# Patient Record
Sex: FEMALE | Race: WHITE | NOT HISPANIC OR LATINO | Employment: OTHER | URBAN - METROPOLITAN AREA
[De-identification: names, ages, dates, MRNs, and addresses within clinical notes are randomized per-mention and may not be internally consistent; named-entity substitution may affect disease eponyms.]

---

## 2018-04-17 DIAGNOSIS — M25.552 LEFT HIP PAIN: Primary | ICD-10-CM

## 2018-04-19 ENCOUNTER — HOSPITAL ENCOUNTER (OUTPATIENT)
Dept: RADIOLOGY | Facility: HOSPITAL | Age: 78
Discharge: HOME | End: 2018-04-19
Attending: ORTHOPAEDIC SURGERY
Payer: MEDICARE

## 2018-04-19 ENCOUNTER — OFFICE VISIT (OUTPATIENT)
Dept: ORTHOPEDICS | Facility: CLINIC | Age: 78
End: 2018-04-19
Payer: MEDICARE

## 2018-04-19 VITALS
HEART RATE: 56 BPM | SYSTOLIC BLOOD PRESSURE: 150 MMHG | BODY MASS INDEX: 25.76 KG/M2 | WEIGHT: 140 LBS | HEIGHT: 62 IN | DIASTOLIC BLOOD PRESSURE: 67 MMHG

## 2018-04-19 DIAGNOSIS — M70.62 TROCHANTERIC BURSITIS, LEFT HIP: Primary | ICD-10-CM

## 2018-04-19 DIAGNOSIS — M25.552 LEFT HIP PAIN: ICD-10-CM

## 2018-04-19 PROCEDURE — 99213 OFFICE O/P EST LOW 20 MIN: CPT | Mod: PBBFAC,PN | Performed by: ORTHOPAEDIC SURGERY

## 2018-04-19 PROCEDURE — 99999 PR PBB SHADOW E&M-EST. PATIENT-LVL III: CPT | Mod: PBBFAC,,, | Performed by: ORTHOPAEDIC SURGERY

## 2018-04-19 PROCEDURE — 99203 OFFICE O/P NEW LOW 30 MIN: CPT | Mod: S$PBB,,, | Performed by: ORTHOPAEDIC SURGERY

## 2018-04-19 NOTE — PROGRESS NOTES
History reviewed. No pertinent past medical history.    History reviewed. No pertinent surgical history.    No current outpatient prescriptions on file.     No current facility-administered medications for this visit.        Review of patient's allergies indicates:   Allergen Reactions    Flagyl [metronidazole hcl]     Morphine     Pcn [penicillins]     Sulfa (sulfonamide antibiotics)        History reviewed. No pertinent family history.    Social History     Social History    Marital status:      Spouse name: N/A    Number of children: N/A    Years of education: N/A     Occupational History    Not on file.     Social History Main Topics    Smoking status: Never Smoker    Smokeless tobacco: Never Used    Alcohol use Not on file    Drug use: Unknown    Sexual activity: Not on file     Other Topics Concern    Not on file     Social History Narrative    No narrative on file       Chief Complaint:   Chief Complaint   Patient presents with    Left Hip - Pain       History of present illness: This is a 78-year-old female seen for left hip and buttock pain.  Patient states that the pain started on April 12, 2018.  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  A lot of pain in the lateral hip and buttock since then.  She went to the emergency room.  She had a steroid injections steroid pills.  Symptoms are improving.  Pain is moderate.  She rates the pain as a 4 out of 10.  She is currently on Aleve.  Pain with prolonged standing and walking.  Has to flex at the hip for the pain to go away.      Review of Systems:    Constitution: Negative for chills, fever, and sweats.  Negative for unexplained weight loss.    HENT:  Negative for headaches and blurry vision.    Cardiovascular:Negative for chest pain or irregular heart beat. Negative for hypertension.    Respiratory:  Negative for cough and shortness of breath.    Gastrointestinal: Negative for abdominal pain, heartburn, melena, nausea,  and vomitting.    Genitourinary:  Negative bladder incontinence and dysuria.    Musculoskeletal:  See HPI    Neurological: Negative for numbness.    Psychiatric/Behavioral: Negative for depression.  The patient is not nervous/anxious.      Endocrine: Negative for polyuria    Hematologic/Lymphatic: Negative for bleeding problem.  Does not bruise/bleed easily.    Skin: Negative for poor would healing and rash      Physical Examination:    Vital Signs:    Vitals:    04/19/18 1351   BP: (!) 150/67   Pulse: (!) 56       Body mass index is 25.61 kg/m².    This a well-developed, well nourished patient in no acute distress.  They are alert and oriented and cooperative to examination.  Pt. walks without an antalgic gait.      Examination of the patient's left hip shows full range of motion with flexion to 160°, extension to 0, external rotation to 50°, internal rotation of 15°, abduction of 50°, adduction of 15°. Skin has no rashes or bruising. Patient has negative Stinchfield exam. Patient has negative straight leg raise.Negative internal impingement test. Negative RNONIE test. Negative Hosea's test. Patient has no pain with hip range of motion.  Mildly tender to palpation over the greater trochanteric bursa. Patient is 5 out of 5 motor strength, palpable distal pulses, and intact light touch sensation.     Examination of the patient's right hip shows full range of motion with flexion to 160°, extension to 0, external rotation to 50°, internal rotation of 15°, abduction of 50°, adduction of 15°. Skin has no rashes or bruising. Patient has negative Stinchfield exam. Patient has negative straight leg raise.Negative internal impingement test. Negative RONNIE test. Negative Hosea's test. Patient has no pain with hip range of motion. Nontender to palpation over the greater trochanteric bursa. Patient is 5 out of 5 motor strength, palpable distal pulses, and intact light touch sensation.     X-rays: X-rays left hip are available from  outside facility which show well-maintained joint space     Assessment:: Possible left bursitis for sciatica    Plan:  I reviewed the findings with her today.  Patient had appropriate treatment already.  I recommended some physical therapy to see if we can't speed up the process.  Continue to take the Aleve.    This note was created using Dragon voice recognition software that occasionally misinterpreted phrases or words.    Consult note is delivered via Epic messaging service.

## 2019-09-09 ENCOUNTER — TELEPHONE (OUTPATIENT)
Dept: FAMILY MEDICINE | Facility: CLINIC | Age: 79
End: 2019-09-09

## 2019-09-09 NOTE — TELEPHONE ENCOUNTER
Spoke with pt, she is a new pt.We will not be able to see her at this time. She will need to go to the ER. Pt states she will go to the ER.

## 2019-09-09 NOTE — TELEPHONE ENCOUNTER
----- Message from Brittnee Hawkins LPN sent at 9/9/2019 12:39 PM CDT -----      ----- Message -----  From: Coty Rodriguez  Sent: 9/9/2019  11:58 AM  To: Monster Cardona Staff    Pt has an appt. On the 25th and would like to be seen sooner. She has a pain on her right side, nauseated, just doesn't feel good. Thought she could shake it but is still going on after 2 weeks.   [phone]

## 2019-09-09 NOTE — TELEPHONE ENCOUNTER
----- Message from Coty Rodriguez sent at 9/9/2019 11:58 AM CDT -----  Pt has an appt. On the 25th and would like to be seen sooner. She has a pain on her right side, nauseated, just doesn't feel good. Thought she could shake it but is still going on after 2 weeks.   [phone]

## 2019-09-25 ENCOUNTER — OFFICE VISIT (OUTPATIENT)
Dept: FAMILY MEDICINE | Facility: CLINIC | Age: 79
End: 2019-09-25
Payer: MEDICARE

## 2019-09-25 VITALS
BODY MASS INDEX: 27.29 KG/M2 | HEART RATE: 74 BPM | SYSTOLIC BLOOD PRESSURE: 130 MMHG | DIASTOLIC BLOOD PRESSURE: 70 MMHG | HEIGHT: 60 IN | WEIGHT: 139 LBS

## 2019-09-25 DIAGNOSIS — F32.A DEPRESSION, UNSPECIFIED DEPRESSION TYPE: ICD-10-CM

## 2019-09-25 DIAGNOSIS — Z90.49 HISTORY OF COLON RESECTION: ICD-10-CM

## 2019-09-25 DIAGNOSIS — E78.5 HYPERLIPIDEMIA, UNSPECIFIED HYPERLIPIDEMIA TYPE: Primary | ICD-10-CM

## 2019-09-25 DIAGNOSIS — G45.9 TIA (TRANSIENT ISCHEMIC ATTACK): ICD-10-CM

## 2019-09-25 DIAGNOSIS — I10 HYPERTENSION, UNSPECIFIED TYPE: ICD-10-CM

## 2019-09-25 DIAGNOSIS — E03.9 HYPOTHYROIDISM, UNSPECIFIED TYPE: ICD-10-CM

## 2019-09-25 DIAGNOSIS — K21.9 GASTROESOPHAGEAL REFLUX DISEASE, ESOPHAGITIS PRESENCE NOT SPECIFIED: ICD-10-CM

## 2019-09-25 PROCEDURE — 99214 OFFICE O/P EST MOD 30 MIN: CPT | Mod: S$GLB,,, | Performed by: NURSE PRACTITIONER

## 2019-09-25 PROCEDURE — 99214 PR OFFICE/OUTPT VISIT, EST, LEVL IV, 30-39 MIN: ICD-10-PCS | Mod: S$GLB,,, | Performed by: NURSE PRACTITIONER

## 2019-09-25 RX ORDER — LEVOTHYROXINE SODIUM 50 UG/1
50 TABLET ORAL DAILY
COMMUNITY
End: 2020-05-04

## 2019-09-25 RX ORDER — LISINOPRIL 10 MG/1
TABLET ORAL DAILY
COMMUNITY
End: 2020-01-02

## 2019-09-25 RX ORDER — CLOPIDOGREL BISULFATE 75 MG/1
75 TABLET ORAL DAILY
COMMUNITY
End: 2020-10-15 | Stop reason: SDUPTHER

## 2019-09-25 RX ORDER — SERTRALINE HYDROCHLORIDE 50 MG/1
50 TABLET, FILM COATED ORAL DAILY
COMMUNITY
End: 2020-09-14 | Stop reason: SDUPTHER

## 2019-09-25 RX ORDER — PANTOPRAZOLE SODIUM 40 MG/1
40 TABLET, DELAYED RELEASE ORAL DAILY
COMMUNITY
End: 2020-02-20 | Stop reason: SDUPTHER

## 2019-09-25 RX ORDER — ASPIRIN 81 MG/1
81 TABLET ORAL DAILY
COMMUNITY
End: 2022-10-03

## 2019-09-25 RX ORDER — PRAVASTATIN SODIUM 20 MG/1
20 TABLET ORAL DAILY
COMMUNITY
End: 2019-10-08 | Stop reason: DRUGHIGH

## 2019-09-25 RX ORDER — AMLODIPINE BESYLATE 5 MG/1
5 TABLET ORAL DAILY
COMMUNITY
End: 2020-11-06 | Stop reason: SDUPTHER

## 2019-09-25 NOTE — PROGRESS NOTES
Patient ID: Hailey Mariano is a 79 y.o. female.    Chief Complaint: Annual Exam (New pt )    HPI  Presents as new patient to establish care. No recently primary care provider. History of Tia in 2017. Treated for hypertension, hyperlipidemia, hypothyroid gerd and depression. This processes she reports are well controlled with medication. History of colon resection with DR. Goff several years ago due to significant diverticulitis. No problems persist. Sleeps ok. Likes to stay up late playing games and sleeps until about 9. Stays busy during the day. No recent labs.       Past Medical History:   Diagnosis Date    Anxiety     Depression     Diverticulitis     GERD (gastroesophageal reflux disease)     Hyperlipidemia     Hypertension     Ovarian cancer 2002    TIA (transient ischemic attack)      Past Surgical History:   Procedure Laterality Date    HERNIA REPAIR      HYSTERECTOMY           Tobacco History:  reports that she has never smoked. She has never used smokeless tobacco.      Review of patient's allergies indicates:   Allergen Reactions    Flagyl [metronidazole hcl]     Morphine     Pcn [penicillins]     Sulfa (sulfonamide antibiotics)        Current Outpatient Medications:     amLODIPine (NORVASC) 5 MG tablet, Take 5 mg by mouth once daily., Disp: , Rfl:     aspirin (ECOTRIN) 81 MG EC tablet, Take 81 mg by mouth once daily., Disp: , Rfl:     clopidogrel (PLAVIX) 75 mg tablet, Take 75 mg by mouth once daily., Disp: , Rfl:     levothyroxine (SYNTHROID) 50 MCG tablet, Take 50 mcg by mouth once daily., Disp: , Rfl:     lisinopril 10 MG tablet, Take by mouth once daily., Disp: , Rfl:     multivit-minerals/folic acid (SPECTRAVITE ADULT ORAL), Take by mouth., Disp: , Rfl:     pantoprazole (PROTONIX) 40 MG tablet, Take 40 mg by mouth once daily., Disp: , Rfl:     pravastatin (PRAVACHOL) 20 MG tablet, Take 20 mg by mouth once daily., Disp: , Rfl:     sertraline (ZOLOFT) 50 MG tablet, Take  "50 mg by mouth once daily., Disp: , Rfl:     Review of Systems   Constitutional: Negative for chills, fever and unexpected weight change.   HENT: Negative for ear pain, rhinorrhea and sore throat.    Eyes: Negative for pain and visual disturbance.   Respiratory: Negative for cough and shortness of breath.    Cardiovascular: Negative for chest pain, palpitations and leg swelling.   Gastrointestinal: Negative for abdominal pain, diarrhea, nausea and vomiting.   Genitourinary: Negative for difficulty urinating, hematuria and vaginal bleeding.   Musculoskeletal: Negative for arthralgias.   Skin: Negative for rash.   Neurological: Negative for dizziness, weakness and headaches.   Psychiatric/Behavioral: Negative for agitation and sleep disturbance. The patient is not nervous/anxious.           Objective:      Vitals:    09/25/19 1235   BP: 130/70   Pulse: 74   Weight: 63 kg (139 lb)   Height: 4' 11.5" (1.511 m)     Physical Exam   Constitutional: She is oriented to person, place, and time. She appears well-developed and well-nourished.   HENT:   Head: Normocephalic.   Right Ear: External ear normal.   Left Ear: External ear normal.   Mouth/Throat: Oropharynx is clear and moist.   Eyes: Pupils are equal, round, and reactive to light. Conjunctivae are normal.   Neck: Normal range of motion. Neck supple. No JVD present.   Cardiovascular: Normal rate and regular rhythm.   No murmur heard.  Pulmonary/Chest: Effort normal and breath sounds normal.   Abdominal: Soft. Bowel sounds are normal.   Musculoskeletal: Normal range of motion. She exhibits no edema or deformity.   Lymphadenopathy:     She has no cervical adenopathy.   Neurological: She is alert and oriented to person, place, and time. Gait normal.   Skin: Skin is warm, dry and intact. No rash noted.   Psychiatric: She has a normal mood and affect. Her speech is normal and behavior is normal.         Assessment:       1. Hyperlipidemia, unspecified hyperlipidemia type  "   2. TIA (transient ischemic attack)    3. Hypertension, unspecified type    4. Gastroesophageal reflux disease, esophagitis presence not specified    5. Depression, unspecified depression type    6. Hypothyroidism, unspecified type    7. History of colon resection           Plan:       Hyperlipidemia, unspecified hyperlipidemia type  -     Comprehensive metabolic panel; Future; Expected date: 09/25/2019  -     Lipid panel; Future; Expected date: 09/25/2019    TIA (transient ischemic attack)  -     CBC auto differential; Future; Expected date: 09/25/2019    Hypertension, unspecified type  -     Microalbumin/creatinine urine ratio  -     Urinalysis, Reflex to Urine Culture Urine, Clean Catch    Gastroesophageal reflux disease, esophagitis presence not specified    Depression, unspecified depression type    Hypothyroidism, unspecified type  -     TSH w/reflex to FT4; Future; Expected date: 09/25/2019    History of colon resection      Follow up in about 3 months (around 12/25/2019).        9/27/2019 Linnette Ghotra NP

## 2019-10-01 LAB
ALBUMIN SERPL-MCNC: 4.1 G/DL (ref 3.6–5.1)
ALBUMIN/GLOB SERPL: 1.8 (CALC) (ref 1–2.5)
ALP SERPL-CCNC: 40 U/L (ref 33–130)
ALT SERPL-CCNC: 15 U/L (ref 6–29)
AST SERPL-CCNC: 18 U/L (ref 10–35)
BASOPHILS # BLD AUTO: 42 CELLS/UL (ref 0–200)
BASOPHILS NFR BLD AUTO: 0.6 %
BILIRUB SERPL-MCNC: 0.5 MG/DL (ref 0.2–1.2)
BUN SERPL-MCNC: 22 MG/DL (ref 7–25)
BUN/CREAT SERPL: 21 (CALC) (ref 6–22)
CALCIUM SERPL-MCNC: 9.4 MG/DL (ref 8.6–10.4)
CHLORIDE SERPL-SCNC: 108 MMOL/L (ref 98–110)
CHOLEST SERPL-MCNC: 207 MG/DL
CHOLEST/HDLC SERPL: 4.7 (CALC)
CO2 SERPL-SCNC: 27 MMOL/L (ref 20–32)
CREAT SERPL-MCNC: 1.04 MG/DL (ref 0.6–0.93)
EOSINOPHIL # BLD AUTO: 224 CELLS/UL (ref 15–500)
EOSINOPHIL NFR BLD AUTO: 3.2 %
ERYTHROCYTE [DISTWIDTH] IN BLOOD BY AUTOMATED COUNT: 13 % (ref 11–15)
GFRSERPLBLD MDRD-ARVRAT: 51 ML/MIN/1.73M2
GLOBULIN SER CALC-MCNC: 2.3 G/DL (CALC) (ref 1.9–3.7)
GLUCOSE SERPL-MCNC: 104 MG/DL (ref 65–99)
HCT VFR BLD AUTO: 34.4 % (ref 35–45)
HDLC SERPL-MCNC: 44 MG/DL
HGB BLD-MCNC: 10.9 G/DL (ref 11.7–15.5)
LDLC SERPL CALC-MCNC: 131 MG/DL (CALC)
LYMPHOCYTES # BLD AUTO: 2534 CELLS/UL (ref 850–3900)
LYMPHOCYTES NFR BLD AUTO: 36.2 %
MCH RBC QN AUTO: 28.5 PG (ref 27–33)
MCHC RBC AUTO-ENTMCNC: 31.7 G/DL (ref 32–36)
MCV RBC AUTO: 89.8 FL (ref 80–100)
MONOCYTES # BLD AUTO: 434 CELLS/UL (ref 200–950)
MONOCYTES NFR BLD AUTO: 6.2 %
NEUTROPHILS # BLD AUTO: 3766 CELLS/UL (ref 1500–7800)
NEUTROPHILS NFR BLD AUTO: 53.8 %
NONHDLC SERPL-MCNC: 163 MG/DL (CALC)
PLATELET # BLD AUTO: 218 THOUSAND/UL (ref 140–400)
PMV BLD REES-ECKER: 10.6 FL (ref 7.5–12.5)
POTASSIUM SERPL-SCNC: 4.2 MMOL/L (ref 3.5–5.3)
PROT SERPL-MCNC: 6.4 G/DL (ref 6.1–8.1)
RBC # BLD AUTO: 3.83 MILLION/UL (ref 3.8–5.1)
SODIUM SERPL-SCNC: 143 MMOL/L (ref 135–146)
TRIGL SERPL-MCNC: 183 MG/DL
TSH SERPL-ACNC: 0.7 MIU/L (ref 0.4–4.5)
WBC # BLD AUTO: 7 THOUSAND/UL (ref 3.8–10.8)

## 2019-10-08 ENCOUNTER — TELEPHONE (OUTPATIENT)
Dept: FAMILY MEDICINE | Facility: CLINIC | Age: 79
End: 2019-10-08

## 2019-10-08 DIAGNOSIS — D64.9 ANEMIA, UNSPECIFIED TYPE: Primary | ICD-10-CM

## 2019-10-08 DIAGNOSIS — N28.9 FUNCTION KIDNEY DECREASED: ICD-10-CM

## 2019-10-08 DIAGNOSIS — L30.9 DERMATITIS: ICD-10-CM

## 2019-10-08 DIAGNOSIS — E78.5 HYPERLIPIDEMIA, UNSPECIFIED HYPERLIPIDEMIA TYPE: Primary | ICD-10-CM

## 2019-10-08 NOTE — TELEPHONE ENCOUNTER
----- Message from Linnette Ghotra NP sent at 10/8/2019  2:54 PM CDT -----  You are anemic. We will do additional labs to check your iron level. Kidney function is slightly decreased. Avoid over the counter NSAIDS such as motrin, aleve, etc. Your cholesterol is elevated. Increase pravastatin to 40mg once a day. Your thyroid is within normal limits. Repeat cbc, serum ferritin, stool for blood cmp in 2 weeks.

## 2019-10-08 NOTE — TELEPHONE ENCOUNTER
Spoke to patient with lab results verbatim per Linnette. Verbalized understanding on all. New dose of Pravastatin pended for signature. Allergies and pharmacy reviewed. Remind me created for lab.     I did a 90 days supply, no refills on Pravastatin, derek. Also, Patient said she doesn't know strength of cream but thinks it is the lowest dose.

## 2019-10-08 NOTE — TELEPHONE ENCOUNTER
----- Message from Dayana Floyd sent at 10/8/2019 11:02 AM CDT -----  Needs to talk to denis about blood results.  Call [phone]. Also, needs refills on clotrimazole and betamethaxone cream.

## 2019-10-09 RX ORDER — CLOTRIMAZOLE AND BETAMETHASONE DIPROPIONATE 10; .64 MG/G; MG/G
CREAM TOPICAL 2 TIMES DAILY
Qty: 60 G | Refills: 0 | Status: SHIPPED | OUTPATIENT
Start: 2019-10-09 | End: 2020-05-04

## 2019-10-09 RX ORDER — PRAVASTATIN SODIUM 40 MG/1
40 TABLET ORAL DAILY
Qty: 90 TABLET | Refills: 0 | Status: SHIPPED | OUTPATIENT
Start: 2019-10-09 | End: 2020-02-20 | Stop reason: SDUPTHER

## 2019-10-17 ENCOUNTER — TELEPHONE (OUTPATIENT)
Dept: FAMILY MEDICINE | Facility: CLINIC | Age: 79
End: 2019-10-17

## 2019-10-17 NOTE — TELEPHONE ENCOUNTER
From Remind Me:         Linnette Ghotra NP sent at 10/8/2019  2:54 PM CDT -----   You are anemic. We will do additional labs to check your iron level. Kidney function is slightly decreased. Avoid over the counter NSAIDS such as motrin, aleve, etc. Your cholesterol is elevated. Increase pravastatin to 40mg once a day. Your thyroid is within normal limits. Repeat cbc, serum ferritin, stool for blood cmp in 2 weeks.     Orders sent. Just needs reminder call.

## 2019-10-23 LAB
ALBUMIN SERPL-MCNC: 4.2 G/DL (ref 3.6–5.1)
ALBUMIN/GLOB SERPL: 1.8 (CALC) (ref 1–2.5)
ALP SERPL-CCNC: 45 U/L (ref 33–130)
ALT SERPL-CCNC: 12 U/L (ref 6–29)
AST SERPL-CCNC: 17 U/L (ref 10–35)
BASOPHILS # BLD AUTO: 53 CELLS/UL (ref 0–200)
BASOPHILS NFR BLD AUTO: 0.7 %
BILIRUB SERPL-MCNC: 0.5 MG/DL (ref 0.2–1.2)
BUN SERPL-MCNC: 26 MG/DL (ref 7–25)
BUN/CREAT SERPL: 21 (CALC) (ref 6–22)
CALCIUM SERPL-MCNC: 9.4 MG/DL (ref 8.6–10.4)
CHLORIDE SERPL-SCNC: 106 MMOL/L (ref 98–110)
CO2 SERPL-SCNC: 29 MMOL/L (ref 20–32)
CREAT SERPL-MCNC: 1.22 MG/DL (ref 0.6–0.93)
EOSINOPHIL # BLD AUTO: 240 CELLS/UL (ref 15–500)
EOSINOPHIL NFR BLD AUTO: 3.2 %
ERYTHROCYTE [DISTWIDTH] IN BLOOD BY AUTOMATED COUNT: 12.4 % (ref 11–15)
FERRITIN SERPL-MCNC: 70 NG/ML (ref 16–288)
GFRSERPLBLD MDRD-ARVRAT: 42 ML/MIN/1.73M2
GLOBULIN SER CALC-MCNC: 2.3 G/DL (CALC) (ref 1.9–3.7)
GLUCOSE SERPL-MCNC: 112 MG/DL (ref 65–99)
HCT VFR BLD AUTO: 33.8 % (ref 35–45)
HEMOCCULT STL QL IA: NORMAL
HGB BLD-MCNC: 11.6 G/DL (ref 11.7–15.5)
LYMPHOCYTES # BLD AUTO: 2483 CELLS/UL (ref 850–3900)
LYMPHOCYTES NFR BLD AUTO: 33.1 %
MCH RBC QN AUTO: 30.1 PG (ref 27–33)
MCHC RBC AUTO-ENTMCNC: 34.3 G/DL (ref 32–36)
MCV RBC AUTO: 87.8 FL (ref 80–100)
MONOCYTES # BLD AUTO: 473 CELLS/UL (ref 200–950)
MONOCYTES NFR BLD AUTO: 6.3 %
NEUTROPHILS # BLD AUTO: 4253 CELLS/UL (ref 1500–7800)
NEUTROPHILS NFR BLD AUTO: 56.7 %
PLATELET # BLD AUTO: 230 THOUSAND/UL (ref 140–400)
PMV BLD REES-ECKER: 10.5 FL (ref 7.5–12.5)
POTASSIUM SERPL-SCNC: 4.2 MMOL/L (ref 3.5–5.3)
PROT SERPL-MCNC: 6.5 G/DL (ref 6.1–8.1)
RBC # BLD AUTO: 3.85 MILLION/UL (ref 3.8–5.1)
SODIUM SERPL-SCNC: 143 MMOL/L (ref 135–146)
WBC # BLD AUTO: 7.5 THOUSAND/UL (ref 3.8–10.8)

## 2019-10-28 ENCOUNTER — TELEPHONE (OUTPATIENT)
Dept: FAMILY MEDICINE | Facility: CLINIC | Age: 79
End: 2019-10-28

## 2019-10-28 DIAGNOSIS — E78.5 HYPERLIPIDEMIA, UNSPECIFIED HYPERLIPIDEMIA TYPE: Primary | ICD-10-CM

## 2019-10-28 NOTE — TELEPHONE ENCOUNTER
----- Message from Monster Cardona MD sent at 10/26/2019  4:57 PM CDT -----  Call patient.  Your stool test for occult blood was negative.

## 2019-10-29 NOTE — TELEPHONE ENCOUNTER
It wasn't and she isn't scheduled to repeat it where I can see as it doesn't look like you ordered it with the other labs or wanted it to be done at another date. Patient was just asking about cholesterol medication when I called with other lab results. I'm assuming you want her to continue on double dose but she doesn't have f/u lipid scheduled that I can see.

## 2019-10-29 NOTE — TELEPHONE ENCOUNTER
Notes recorded by Linnette Ghotra NP on 10/8/2019 at 2:54 PM CDT  You are anemic. We will do additional labs to check your iron level. Kidney function is slightly decreased. Avoid over the counter NSAIDS such as motrin, aleve, etc. Your cholesterol is elevated. Increase pravastatin to 40mg once a day. Your thyroid is within normal limits. Repeat cbc, serum ferritin, stool for blood cmp in 2 weeks

## 2019-10-29 NOTE — TELEPHONE ENCOUNTER
Spoke to patient with results. Would like rest of lab results. Said you were following up on anemia.

## 2019-10-29 NOTE — TELEPHONE ENCOUNTER
I'm confused on the below lab note... That was from 10/8. Patient had f/u labs already and I spoke to her today with new lab results and she wants to know if you still want her to double up on cholesterol medication.

## 2019-10-29 NOTE — TELEPHONE ENCOUNTER
----- Message from Charleen Mcclure sent at 10/29/2019 11:30 AM CDT -----  vm- patient said she has been playing phone tag with someone for her refills please give her a call back [phone]

## 2019-10-29 NOTE — TELEPHONE ENCOUNTER
Spoke to patient, states that the refills call was 2 weeks ago and has been handled. Just need to know now if she is to still double up on cholesterol medication.

## 2019-11-11 DIAGNOSIS — E04.1 THYROID NODULE: Primary | ICD-10-CM

## 2019-11-13 ENCOUNTER — HOSPITAL ENCOUNTER (OUTPATIENT)
Dept: RADIOLOGY | Facility: HOSPITAL | Age: 79
Discharge: HOME | End: 2019-11-13
Attending: OTOLARYNGOLOGY
Payer: MEDICARE

## 2019-11-13 DIAGNOSIS — E04.1 THYROID NODULE: ICD-10-CM

## 2019-11-13 PROCEDURE — 76536 US EXAM OF HEAD AND NECK: CPT | Mod: TC,PO

## 2019-11-14 ENCOUNTER — LAB VISIT (OUTPATIENT)
Dept: LAB | Facility: HOSPITAL | Age: 79
End: 2019-11-14
Attending: INTERNAL MEDICINE
Payer: MEDICARE

## 2019-11-14 DIAGNOSIS — E03.9 MYXEDEMA HEART DISEASE: Primary | ICD-10-CM

## 2019-11-14 DIAGNOSIS — I51.9 MYXEDEMA HEART DISEASE: Primary | ICD-10-CM

## 2019-11-14 DIAGNOSIS — R55 SYNCOPE AND COLLAPSE: ICD-10-CM

## 2019-11-14 DIAGNOSIS — E78.5 HYPERLIPEMIA: ICD-10-CM

## 2019-11-14 DIAGNOSIS — I67.89 ACUTE, BUT ILL-DEFINED, CEREBROVASCULAR DISEASE: ICD-10-CM

## 2019-11-14 LAB
ALT SERPL W/O P-5'-P-CCNC: 16 U/L (ref 10–44)
AST SERPL-CCNC: 19 U/L (ref 10–40)
CHOLEST SERPL-MCNC: 188 MG/DL (ref 120–199)
CHOLEST/HDLC SERPL: 4.2 {RATIO} (ref 2–5)
HDLC SERPL-MCNC: 45 MG/DL (ref 40–75)
HDLC SERPL: 23.9 % (ref 20–50)
LDLC SERPL CALC-MCNC: 107.4 MG/DL (ref 63–159)
NONHDLC SERPL-MCNC: 143 MG/DL
TRIGL SERPL-MCNC: 178 MG/DL (ref 30–150)

## 2019-11-14 PROCEDURE — 84450 TRANSFERASE (AST) (SGOT): CPT

## 2019-11-14 PROCEDURE — 36415 COLL VENOUS BLD VENIPUNCTURE: CPT

## 2019-11-14 PROCEDURE — 84460 ALANINE AMINO (ALT) (SGPT): CPT

## 2019-11-14 PROCEDURE — 80061 LIPID PANEL: CPT

## 2019-12-03 ENCOUNTER — OFFICE VISIT (OUTPATIENT)
Dept: FAMILY MEDICINE | Facility: CLINIC | Age: 79
End: 2019-12-03
Payer: MEDICARE

## 2019-12-03 VITALS
HEIGHT: 61 IN | BODY MASS INDEX: 27 KG/M2 | OXYGEN SATURATION: 98 % | HEART RATE: 64 BPM | DIASTOLIC BLOOD PRESSURE: 68 MMHG | TEMPERATURE: 98 F | WEIGHT: 143 LBS | SYSTOLIC BLOOD PRESSURE: 132 MMHG

## 2019-12-03 DIAGNOSIS — J06.9 UPPER RESPIRATORY TRACT INFECTION, UNSPECIFIED TYPE: Primary | ICD-10-CM

## 2019-12-03 DIAGNOSIS — M89.9 DISORDER OF BONE: ICD-10-CM

## 2019-12-03 PROCEDURE — 1159F PR MEDICATION LIST DOCUMENTED IN MEDICAL RECORD: ICD-10-PCS | Mod: S$GLB,,, | Performed by: NURSE PRACTITIONER

## 2019-12-03 PROCEDURE — 99213 PR OFFICE/OUTPT VISIT, EST, LEVL III, 20-29 MIN: ICD-10-PCS | Mod: S$GLB,,, | Performed by: NURSE PRACTITIONER

## 2019-12-03 PROCEDURE — 99213 OFFICE O/P EST LOW 20 MIN: CPT | Mod: S$GLB,,, | Performed by: NURSE PRACTITIONER

## 2019-12-03 PROCEDURE — 1159F MED LIST DOCD IN RCRD: CPT | Mod: S$GLB,,, | Performed by: NURSE PRACTITIONER

## 2019-12-03 RX ORDER — METHYLPREDNISOLONE 4 MG/1
TABLET ORAL
Qty: 1 PACKAGE | Refills: 0 | Status: SHIPPED | OUTPATIENT
Start: 2019-12-03 | End: 2019-12-24

## 2019-12-03 RX ORDER — FLUTICASONE PROPIONATE 50 MCG
2 SPRAY, SUSPENSION (ML) NASAL DAILY
Qty: 15.8 ML | Refills: 0 | Status: SHIPPED | OUTPATIENT
Start: 2019-12-03 | End: 2020-05-04

## 2019-12-03 NOTE — PROGRESS NOTES
Patient ID: Hailey Mariano is a 79 y.o. female.    Chief Complaint: Cough (since Friday, did not bring bottles states she does not need refills -ac ); Sore Throat; and Headache    Pt patient here for sick visit.  Started on Friday with sore throat and nasal congestion as well as body aches.  Had a fever up to 100.9 on Friday and continued to feel poorly over the weekend.  Has a lot of sinus congestion, blowing yellow mucus from her nose as well as cough.  Denies any wheezing shortness of breath.  No fever since Friday.  Admits she feels slightly better today though still feels run down          Past Medical History:   Diagnosis Date    Anxiety     Depression     Diverticulitis     GERD (gastroesophageal reflux disease)     Hyperlipidemia     Hypertension     Ovarian cancer 2002    TIA (transient ischemic attack)      Past Surgical History:   Procedure Laterality Date    HERNIA REPAIR      HYSTERECTOMY           Tobacco History:  reports that she has never smoked. She has never used smokeless tobacco.      Review of patient's allergies indicates:   Allergen Reactions    Flagyl [metronidazole hcl]     Morphine     Pcn [penicillins]     Sulfa (sulfonamide antibiotics)        Current Outpatient Medications:     amLODIPine (NORVASC) 5 MG tablet, Take 5 mg by mouth once daily., Disp: , Rfl:     aspirin (ECOTRIN) 81 MG EC tablet, Take 81 mg by mouth once daily., Disp: , Rfl:     clopidogrel (PLAVIX) 75 mg tablet, Take 75 mg by mouth once daily., Disp: , Rfl:     clotrimazole-betamethasone 1-0.05% (LOTRISONE) cream, Apply topically 2 (two) times daily., Disp: 60 g, Rfl: 0    fluticasone propionate (FLONASE) 50 mcg/actuation nasal spray, 2 sprays (100 mcg total) by Each Nostril route once daily., Disp: 15.8 mL, Rfl: 0    levothyroxine (SYNTHROID) 50 MCG tablet, Take 50 mcg by mouth once daily., Disp: , Rfl:     lisinopril 10 MG tablet, Take by mouth once daily., Disp: , Rfl:     methylPREDNISolone  "(MEDROL DOSEPACK) 4 mg tablet, use as directed, Disp: 1 Package, Rfl: 0    multivit-minerals/folic acid (SPECTRAVITE ADULT ORAL), Take by mouth., Disp: , Rfl:     pantoprazole (PROTONIX) 40 MG tablet, Take 40 mg by mouth once daily., Disp: , Rfl:     pravastatin (PRAVACHOL) 40 MG tablet, Take 1 tablet (40 mg total) by mouth once daily. For cholesterol, Disp: 90 tablet, Rfl: 0    sertraline (ZOLOFT) 50 MG tablet, Take 50 mg by mouth once daily., Disp: , Rfl:     Review of Systems   Constitutional: Negative for chills and fever (None since Friday).   HENT: Positive for congestion, postnasal drip, rhinorrhea and sinus pain. Negative for ear pain and sore throat.    Respiratory: Positive for cough. Negative for shortness of breath and wheezing.    Cardiovascular: Negative for chest pain.   Gastrointestinal: Negative for nausea and vomiting.   Skin: Negative for rash.   Neurological: Negative for dizziness and headaches.          Objective:      Vitals:    12/03/19 1151   BP: 132/68   Pulse: 64   Temp: 98.3 °F (36.8 °C)   SpO2: 98%   Weight: 64.9 kg (143 lb)   Height: 5' 1" (1.549 m)     Physical Exam   Constitutional: She is oriented to person, place, and time. She appears well-developed and well-nourished.   HENT:   Head: Normocephalic and atraumatic.   Right Ear: Tympanic membrane and ear canal normal.   Left Ear: Tympanic membrane and ear canal normal.   Nose: Mucosal edema and rhinorrhea present. Right sinus exhibits no maxillary sinus tenderness and no frontal sinus tenderness. Left sinus exhibits maxillary sinus tenderness. Left sinus exhibits no frontal sinus tenderness.   Mouth/Throat: Mucous membranes are normal. No oropharyngeal exudate or posterior oropharyngeal erythema. No tonsillar exudate.   Eyes: Conjunctivae are normal.   Neck: Neck supple.   Cardiovascular: Normal rate and regular rhythm.   Pulmonary/Chest: Breath sounds normal. She has no wheezes. She has no rales.   Neurological: She is alert and " oriented to person, place, and time.   Skin: No rash noted.   Nursing note and vitals reviewed.        Assessment:       1. Upper respiratory tract infection, unspecified type    2. Disorder of bone           Plan:       Upper respiratory tract infection, unspecified type  -     methylPREDNISolone (MEDROL DOSEPACK) 4 mg tablet; use as directed  Dispense: 1 Package; Refill: 0  -     fluticasone propionate (FLONASE) 50 mcg/actuation nasal spray; 2 sprays (100 mcg total) by Each Nostril route once daily.  Dispense: 15.8 mL; Refill: 0  -patient advised symptoms likely viral given high community activity were seeing- given 5 days of symptoms she's outside the window of treatment for flu and given no fever since Friday advised I do not feel antibiotics are indicated at this time.  Will prescribe Medrol drip dose pack to help sinus and airway congestion/inflammation.  Patient cautioned to call if she worsens including fever over 100.4, purulent sputum, wheezing or shortness of breath    Disorder of bone  -     DXA Bone Density Spine And Hip; Future; Expected date: 12/03/2019      Follow up if symptoms worsen or fail to improve.        12/3/2019 Sydney Lopez NP

## 2019-12-03 NOTE — PATIENT INSTRUCTIONS

## 2019-12-27 ENCOUNTER — TELEPHONE (OUTPATIENT)
Dept: FAMILY MEDICINE | Facility: CLINIC | Age: 79
End: 2019-12-27

## 2019-12-27 NOTE — TELEPHONE ENCOUNTER
Dr Ding had a Lipid done for this patient in Nov. With AST and ALT. Do you still need Cmp done before ov on 1/2?  She will do the urine tests that are outstanding.

## 2019-12-31 NOTE — TELEPHONE ENCOUNTER
Spoke to patient. She is in [place] until 1/2, so she will have urine tests and any other labs done at OV with Linnette /john

## 2020-01-02 ENCOUNTER — OFFICE VISIT (OUTPATIENT)
Dept: FAMILY MEDICINE | Facility: CLINIC | Age: 80
End: 2020-01-02
Payer: MEDICARE

## 2020-01-02 VITALS
DIASTOLIC BLOOD PRESSURE: 50 MMHG | BODY MASS INDEX: 26.81 KG/M2 | WEIGHT: 142 LBS | SYSTOLIC BLOOD PRESSURE: 106 MMHG | HEIGHT: 61 IN | HEART RATE: 68 BPM

## 2020-01-02 DIAGNOSIS — R05.9 COUGH: ICD-10-CM

## 2020-01-02 DIAGNOSIS — E78.5 HYPERLIPIDEMIA, UNSPECIFIED HYPERLIPIDEMIA TYPE: ICD-10-CM

## 2020-01-02 DIAGNOSIS — N28.9 FUNCTION KIDNEY DECREASED: Primary | ICD-10-CM

## 2020-01-02 DIAGNOSIS — F41.9 ANXIETY: ICD-10-CM

## 2020-01-02 DIAGNOSIS — K21.9 GASTROESOPHAGEAL REFLUX DISEASE, ESOPHAGITIS PRESENCE NOT SPECIFIED: ICD-10-CM

## 2020-01-02 DIAGNOSIS — G45.9 TIA (TRANSIENT ISCHEMIC ATTACK): ICD-10-CM

## 2020-01-02 PROCEDURE — 99214 OFFICE O/P EST MOD 30 MIN: CPT | Mod: S$GLB,,, | Performed by: NURSE PRACTITIONER

## 2020-01-02 PROCEDURE — 1159F PR MEDICATION LIST DOCUMENTED IN MEDICAL RECORD: ICD-10-PCS | Mod: S$GLB,,, | Performed by: NURSE PRACTITIONER

## 2020-01-02 PROCEDURE — 1159F MED LIST DOCD IN RCRD: CPT | Mod: S$GLB,,, | Performed by: NURSE PRACTITIONER

## 2020-01-02 PROCEDURE — 99214 PR OFFICE/OUTPT VISIT, EST, LEVL IV, 30-39 MIN: ICD-10-PCS | Mod: S$GLB,,, | Performed by: NURSE PRACTITIONER

## 2020-01-02 NOTE — PROGRESS NOTES
SUBJECTIVE:    Patient ID: Hailey Mariano is a 79 y.o. female.    Chief Complaint: Follow-up (no bottles, has ? about Lisinopril// SW)    79-year-old female presents for check up.  Reports overall doing okay.  Recently had thyroid biopsy performed in ENT office.  Biopsy results showed benign thyroid lesions.  However patient does report still having nonproductive cough.  Concerned that may be related to lisinopril.  Not short duration of cough or when cough actually started.  Pretty persistent throughout the day.  Feels great otherwise.  Last labs did show decreased kidney function.  Patient is prepared to repeat kidney function today.  Patient does report at that time was taken NSAIDs on a daily basis.  However since kidney function was decreased on last labs patient has stopped.  Sleeps okay.  Has improved significantly since last visit.      Lab Visit on 11/14/2019   Component Date Value Ref Range Status    Cholesterol 11/14/2019 188  120 - 199 mg/dL Final    Triglycerides 11/14/2019 178* 30 - 150 mg/dL Final    HDL 11/14/2019 45  40 - 75 mg/dL Final    LDL Cholesterol 11/14/2019 107.4  63.0 - 159.0 mg/dL Final    Hdl/Cholesterol Ratio 11/14/2019 23.9  20.0 - 50.0 % Final    Total Cholesterol/HDL Ratio 11/14/2019 4.2  2.0 - 5.0 Final    Non-HDL Cholesterol 11/14/2019 143  mg/dL Final    AST 11/14/2019 19  10 - 40 U/L Final    ALT 11/14/2019 16  10 - 44 U/L Final   Orders Only on 10/22/2019   Component Date Value Ref Range Status    Fecal Globin by [employer]. (Medic* 10/22/2019    Final   Orders Only on 10/08/2019   Component Date Value Ref Range Status    WBC 10/21/2019 7.5  3.8 - 10.8 Thousand/uL Final    RBC 10/21/2019 3.85  3.80 - 5.10 Million/uL Final    Hemoglobin 10/21/2019 11.6* 11.7 - 15.5 g/dL Final    Hematocrit 10/21/2019 33.8* 35.0 - 45.0 % Final    Mean Corpuscular Volume 10/21/2019 87.8  80.0 - 100.0 fL Final    Mean Corpuscular Hemoglobin 10/21/2019 30.1  27.0 - 33.0 pg Final     Mean Corpuscular Hemoglobin Conc 10/21/2019 34.3  32.0 - 36.0 g/dL Final    RDW 10/21/2019 12.4  11.0 - 15.0 % Final    Platelets 10/21/2019 230  140 - 400 Thousand/uL Final    MPV 10/21/2019 10.5  7.5 - 12.5 fL Final    Neutrophils Absolute 10/21/2019 4,253  1,500 - 7,800 cells/uL Final    Lymph # 10/21/2019 2,483  850 - 3,900 cells/uL Final    Mono # 10/21/2019 473  200 - 950 cells/uL Final    Eos # 10/21/2019 240  15 - 500 cells/uL Final    Baso # 10/21/2019 53  0 - 200 cells/uL Final    Neutrophils Relative 10/21/2019 56.7  % Final    Lymph% 10/21/2019 33.1  % Final    Mono% 10/21/2019 6.3  % Final    Eosinophil% 10/21/2019 3.2  % Final    Basophil% 10/21/2019 0.7  % Final    Ferritin 10/21/2019 70  16 - 288 ng/mL Final    Glucose 10/21/2019 112* 65 - 99 mg/dL Final    BUN, Bld 10/21/2019 26* 7 - 25 mg/dL Final    Creatinine 10/21/2019 1.22* 0.60 - 0.93 mg/dL Final    eGFR if non African American 10/21/2019 42* > OR = 60 mL/min/1.73m2 Final    eGFR if  10/21/2019 49* > OR = 60 mL/min/1.73m2 Final    BUN/Creatinine Ratio 10/21/2019 21  6 - 22 (calc) Final    Sodium 10/21/2019 143  135 - 146 mmol/L Final    Potassium 10/21/2019 4.2  3.5 - 5.3 mmol/L Final    Chloride 10/21/2019 106  98 - 110 mmol/L Final    CO2 10/21/2019 29  20 - 32 mmol/L Final    Calcium 10/21/2019 9.4  8.6 - 10.4 mg/dL Final    Total Protein 10/21/2019 6.5  6.1 - 8.1 g/dL Final    Albumin 10/21/2019 4.2  3.6 - 5.1 g/dL Final    Globulin, Total 10/21/2019 2.3  1.9 - 3.7 g/dL (calc) Final    Albumin/Globulin Ratio 10/21/2019 1.8  1.0 - 2.5 (calc) Final    Total Bilirubin 10/21/2019 0.5  0.2 - 1.2 mg/dL Final    Alkaline Phosphatase 10/21/2019 45  33 - 130 U/L Final    AST 10/21/2019 17  10 - 35 U/L Final    ALT 10/21/2019 12  6 - 29 U/L Final   Office Visit on 09/25/2019   Component Date Value Ref Range Status    WBC 09/30/2019 7.0  3.8 - 10.8 Thousand/uL Final    RBC 09/30/2019 3.83  3.80 -  5.10 Million/uL Final    Hemoglobin 09/30/2019 10.9* 11.7 - 15.5 g/dL Final    Hematocrit 09/30/2019 34.4* 35.0 - 45.0 % Final    Mean Corpuscular Volume 09/30/2019 89.8  80.0 - 100.0 fL Final    Mean Corpuscular Hemoglobin 09/30/2019 28.5  27.0 - 33.0 pg Final    Mean Corpuscular Hemoglobin Conc 09/30/2019 31.7* 32.0 - 36.0 g/dL Final    RDW 09/30/2019 13.0  11.0 - 15.0 % Final    Platelets 09/30/2019 218  140 - 400 Thousand/uL Final    MPV 09/30/2019 10.6  7.5 - 12.5 fL Final    Neutrophils Absolute 09/30/2019 3,766  1,500 - 7,800 cells/uL Final    Lymph # 09/30/2019 2,534  850 - 3,900 cells/uL Final    Mono # 09/30/2019 434  200 - 950 cells/uL Final    Eos # 09/30/2019 224  15 - 500 cells/uL Final    Baso # 09/30/2019 42  0 - 200 cells/uL Final    Neutrophils Relative 09/30/2019 53.8  % Final    Lymph% 09/30/2019 36.2  % Final    Mono% 09/30/2019 6.2  % Final    Eosinophil% 09/30/2019 3.2  % Final    Basophil% 09/30/2019 0.6  % Final    Glucose 09/30/2019 104* 65 - 99 mg/dL Final    BUN, Bld 09/30/2019 22  7 - 25 mg/dL Final    Creatinine 09/30/2019 1.04* 0.60 - 0.93 mg/dL Final    eGFR if non African American 09/30/2019 51* > OR = 60 mL/min/1.73m2 Final    eGFR if  09/30/2019 59* > OR = 60 mL/min/1.73m2 Final    BUN/Creatinine Ratio 09/30/2019 21  6 - 22 (calc) Final    Sodium 09/30/2019 143  135 - 146 mmol/L Final    Potassium 09/30/2019 4.2  3.5 - 5.3 mmol/L Final    Chloride 09/30/2019 108  98 - 110 mmol/L Final    CO2 09/30/2019 27  20 - 32 mmol/L Final    Calcium 09/30/2019 9.4  8.6 - 10.4 mg/dL Final    Total Protein 09/30/2019 6.4  6.1 - 8.1 g/dL Final    Albumin 09/30/2019 4.1  3.6 - 5.1 g/dL Final    Globulin, Total 09/30/2019 2.3  1.9 - 3.7 g/dL (calc) Final    Albumin/Globulin Ratio 09/30/2019 1.8  1.0 - 2.5 (calc) Final    Total Bilirubin 09/30/2019 0.5  0.2 - 1.2 mg/dL Final    Alkaline Phosphatase 09/30/2019 40  33 - 130 U/L Final    AST  09/30/2019 18  10 - 35 U/L Final    ALT 09/30/2019 15  6 - 29 U/L Final    Cholesterol 09/30/2019 207* <200 mg/dL Final    HDL 09/30/2019 44* >50 mg/dL Final    Triglycerides 09/30/2019 183* <150 mg/dL Final    LDL Cholesterol 09/30/2019 131* mg/dL (calc) Final    Hdl/Cholesterol Ratio 09/30/2019 4.7  <5.0 (calc) Final    Non HDL Chol. (LDL+VLDL) 09/30/2019 163* <130 mg/dL (calc) Final    TSH w/reflex to FT4 09/30/2019 0.70  0.40 - 4.50 mIU/L Final       Past Medical History:   Diagnosis Date    Anxiety     Depression     Diverticulitis     GERD (gastroesophageal reflux disease)     Hyperlipidemia     Hypertension     Ovarian cancer 2002    TIA (transient ischemic attack)      Past Surgical History:   Procedure Laterality Date    HERNIA REPAIR      HYSTERECTOMY       History reviewed. No pertinent family history.    Marital Status:   Alcohol History:  reports that she drank about 1.0 standard drinks of alcohol per week.  Tobacco History:  reports that she has never smoked. She has never used smokeless tobacco.  Drug History:  has no drug history on file.    Review of patient's allergies indicates:   Allergen Reactions    Flagyl [metronidazole hcl]     Morphine     Pcn [penicillins]     Sulfa (sulfonamide antibiotics)        Current Outpatient Medications:     amLODIPine (NORVASC) 5 MG tablet, Take 5 mg by mouth once daily., Disp: , Rfl:     aspirin (ECOTRIN) 81 MG EC tablet, Take 81 mg by mouth once daily., Disp: , Rfl:     clopidogrel (PLAVIX) 75 mg tablet, Take 75 mg by mouth once daily., Disp: , Rfl:     clotrimazole-betamethasone 1-0.05% (LOTRISONE) cream, Apply topically 2 (two) times daily., Disp: 60 g, Rfl: 0    fluticasone propionate (FLONASE) 50 mcg/actuation nasal spray, 2 sprays (100 mcg total) by Each Nostril route once daily., Disp: 15.8 mL, Rfl: 0    levothyroxine (SYNTHROID) 50 MCG tablet, Take 50 mcg by mouth once daily., Disp: , Rfl:     multivit-minerals/folic  "acid (SPECTRAVITE ADULT ORAL), Take by mouth., Disp: , Rfl:     pantoprazole (PROTONIX) 40 MG tablet, Take 40 mg by mouth once daily., Disp: , Rfl:     pravastatin (PRAVACHOL) 40 MG tablet, Take 1 tablet (40 mg total) by mouth once daily. For cholesterol, Disp: 90 tablet, Rfl: 0    sertraline (ZOLOFT) 50 MG tablet, Take 50 mg by mouth once daily., Disp: , Rfl:     Review of Systems   Constitutional: Negative for chills, fever and unexpected weight change.   HENT: Negative for ear pain, rhinorrhea and sore throat.    Eyes: Negative for pain and visual disturbance.   Respiratory: Positive for cough. Negative for shortness of breath.    Cardiovascular: Negative for chest pain, palpitations and leg swelling.   Gastrointestinal: Negative for abdominal pain, diarrhea, nausea and vomiting.   Genitourinary: Negative for difficulty urinating, hematuria and vaginal bleeding.   Musculoskeletal: Negative for arthralgias.   Skin: Negative for rash.   Neurological: Negative for dizziness, weakness and headaches.   Psychiatric/Behavioral: Negative for agitation and sleep disturbance. The patient is not nervous/anxious.           Objective:      Vitals:    01/02/20 1324   BP: (!) 106/50   Pulse: 68   Weight: 64.4 kg (142 lb)   Height: 5' 1" (1.549 m)     Body mass index is 26.83 kg/m².  Physical Exam   Constitutional: She is oriented to person, place, and time. She appears well-developed and well-nourished.   HENT:   Right Ear: External ear normal.   Left Ear: External ear normal.   Mouth/Throat: Oropharynx is clear and moist.   Eyes: Pupils are equal, round, and reactive to light. Conjunctivae are normal.   Neck: Normal range of motion. Neck supple. No JVD present.   Cardiovascular: Normal rate and regular rhythm.   No murmur heard.  Pulmonary/Chest: Effort normal and breath sounds normal.   Abdominal: Soft. Bowel sounds are normal.   Musculoskeletal: Normal range of motion. She exhibits no edema or deformity. "   Lymphadenopathy:     She has no cervical adenopathy.   Neurological: She is alert and oriented to person, place, and time. Gait normal.   Skin: Skin is warm, dry and intact. No rash noted.   Psychiatric: She has a normal mood and affect. Her speech is normal and behavior is normal.         Assessment:       1. Function kidney decreased    2. Anxiety    3. Hyperlipidemia, unspecified hyperlipidemia type    4. Gastroesophageal reflux disease, esophagitis presence not specified    5. TIA (transient ischemic attack)    6. Cough         Plan:       Function kidney decreased  -     Comprehensive metabolic panel; Future; Expected date: 01/02/2020  -     Urinalysis; Future; Expected date: 01/02/2020  -     Microalbumin/creatinine urine ratio; Future; Expected date: 01/02/2020    Anxiety    Hyperlipidemia, unspecified hyperlipidemia type    Gastroesophageal reflux disease, esophagitis presence not specified    TIA (transient ischemic attack)    Cough  Comments:  Stop lisinopril due to cough.  Monitor r blood pressure daily.  Readings to office in 2 weeks.  If cough does not resolve please contact office.      Follow up in about 6 months (around 7/2/2020) for Follow up.

## 2020-01-03 LAB
ALBUMIN SERPL-MCNC: 3.9 G/DL (ref 3.6–5.1)
ALBUMIN/CREAT UR: 7 MCG/MG CREAT
ALBUMIN/GLOB SERPL: 1.6 (CALC) (ref 1–2.5)
ALP SERPL-CCNC: 40 U/L (ref 33–130)
ALT SERPL-CCNC: 10 U/L (ref 6–29)
APPEARANCE UR: CLEAR
AST SERPL-CCNC: 17 U/L (ref 10–35)
BILIRUB SERPL-MCNC: 0.4 MG/DL (ref 0.2–1.2)
BILIRUB UR QL STRIP: NEGATIVE
BUN SERPL-MCNC: 17 MG/DL (ref 7–25)
BUN/CREAT SERPL: 15 (CALC) (ref 6–22)
CALCIUM SERPL-MCNC: 8.8 MG/DL (ref 8.6–10.4)
CHLORIDE SERPL-SCNC: 106 MMOL/L (ref 98–110)
CO2 SERPL-SCNC: 27 MMOL/L (ref 20–32)
COLOR UR: ABNORMAL
CREAT SERPL-MCNC: 1.14 MG/DL (ref 0.6–0.93)
CREAT UR-MCNC: 331 MG/DL (ref 20–275)
GFRSERPLBLD MDRD-ARVRAT: 46 ML/MIN/1.73M2
GLOBULIN SER CALC-MCNC: 2.5 G/DL (CALC) (ref 1.9–3.7)
GLUCOSE SERPL-MCNC: 77 MG/DL (ref 65–139)
GLUCOSE UR QL STRIP: NEGATIVE
HGB UR QL STRIP: NEGATIVE
KETONES UR QL STRIP: ABNORMAL
LEUKOCYTE ESTERASE UR QL STRIP: ABNORMAL
MICROALBUMIN UR-MCNC: 2.2 MG/DL
NITRITE UR QL STRIP: NEGATIVE
PH UR STRIP: ABNORMAL [PH] (ref 5–8)
POTASSIUM SERPL-SCNC: 3.3 MMOL/L (ref 3.5–5.3)
PROT SERPL-MCNC: 6.4 G/DL (ref 6.1–8.1)
PROT UR QL STRIP: NEGATIVE
SODIUM SERPL-SCNC: 144 MMOL/L (ref 135–146)
SP GR UR STRIP: 1.02 (ref 1–1.03)

## 2020-01-29 DIAGNOSIS — N28.9 FUNCTION KIDNEY DECREASED: Primary | ICD-10-CM

## 2020-01-30 ENCOUNTER — TELEPHONE (OUTPATIENT)
Dept: FAMILY MEDICINE | Facility: CLINIC | Age: 80
End: 2020-01-30

## 2020-01-30 NOTE — TELEPHONE ENCOUNTER
----- Message from Linnette Ghotra NP sent at 1/29/2020  9:19 PM CST -----  Kidney function did improve since previous lab draw. Repeat cmp at the end of February.

## 2020-01-31 ENCOUNTER — TELEPHONE (OUTPATIENT)
Dept: FAMILY MEDICINE | Facility: CLINIC | Age: 80
End: 2020-01-31

## 2020-02-20 DIAGNOSIS — E78.5 HYPERLIPIDEMIA, UNSPECIFIED HYPERLIPIDEMIA TYPE: ICD-10-CM

## 2020-02-20 RX ORDER — PRAVASTATIN SODIUM 40 MG/1
40 TABLET ORAL DAILY
Qty: 90 TABLET | Refills: 1 | Status: SHIPPED | OUTPATIENT
Start: 2020-02-20 | End: 2020-09-14 | Stop reason: SDUPTHER

## 2020-02-20 RX ORDER — PANTOPRAZOLE SODIUM 40 MG/1
40 TABLET, DELAYED RELEASE ORAL DAILY
Qty: 90 TABLET | Refills: 1 | Status: SHIPPED | OUTPATIENT
Start: 2020-02-20 | End: 2020-02-26 | Stop reason: SDUPTHER

## 2020-02-20 NOTE — TELEPHONE ENCOUNTER
----- Message from Bindu Capone sent at 2/20/2020 12:47 PM CST -----  Contact: Hailey MASON 12:29  PM  Refill pravastatin and pantoprazole. [facility] on [address]. No phone # was left. GH

## 2020-02-26 RX ORDER — PANTOPRAZOLE SODIUM 40 MG/1
40 TABLET, DELAYED RELEASE ORAL DAILY
Qty: 90 TABLET | Refills: 1 | Status: SHIPPED | OUTPATIENT
Start: 2020-02-26 | End: 2020-09-14 | Stop reason: SDUPTHER

## 2020-02-26 NOTE — TELEPHONE ENCOUNTER
----- Message from Charleen Mcclure sent at 2/26/2020 12:49 PM CST -----  vm- patient needs a refill of pantoprazole

## 2020-03-03 LAB
ALBUMIN SERPL-MCNC: 4.1 G/DL (ref 3.6–5.1)
ALBUMIN/GLOB SERPL: 2 (CALC) (ref 1–2.5)
ALP SERPL-CCNC: 40 U/L (ref 37–153)
ALT SERPL-CCNC: 14 U/L (ref 6–29)
AST SERPL-CCNC: 17 U/L (ref 10–35)
BILIRUB SERPL-MCNC: 0.4 MG/DL (ref 0.2–1.2)
BUN SERPL-MCNC: 17 MG/DL (ref 7–25)
BUN/CREAT SERPL: 15 (CALC) (ref 6–22)
CALCIUM SERPL-MCNC: 9.3 MG/DL (ref 8.6–10.4)
CHLORIDE SERPL-SCNC: 105 MMOL/L (ref 98–110)
CHOLEST SERPL-MCNC: 143 MG/DL
CHOLEST/HDLC SERPL: 2.6 (CALC)
CO2 SERPL-SCNC: 27 MMOL/L (ref 20–32)
CREAT SERPL-MCNC: 1.11 MG/DL (ref 0.6–0.93)
GFRSERPLBLD MDRD-ARVRAT: 47 ML/MIN/1.73M2
GLOBULIN SER CALC-MCNC: 2.1 G/DL (CALC) (ref 1.9–3.7)
GLUCOSE SERPL-MCNC: 110 MG/DL (ref 65–139)
HDLC SERPL-MCNC: 54 MG/DL
LDLC SERPL CALC-MCNC: 62 MG/DL (CALC)
NONHDLC SERPL-MCNC: 89 MG/DL (CALC)
POTASSIUM SERPL-SCNC: 4 MMOL/L (ref 3.5–5.3)
PROT SERPL-MCNC: 6.2 G/DL (ref 6.1–8.1)
SODIUM SERPL-SCNC: 142 MMOL/L (ref 135–146)
TRIGL SERPL-MCNC: 205 MG/DL

## 2020-03-26 ENCOUNTER — TELEPHONE (OUTPATIENT)
Dept: FAMILY MEDICINE | Facility: CLINIC | Age: 80
End: 2020-03-26

## 2020-03-26 NOTE — TELEPHONE ENCOUNTER
----- Message from Linnette Ghotra NP sent at 3/26/2020 12:32 PM CDT -----  Kidney function slightly improved. Continue as is.

## 2020-03-26 NOTE — TELEPHONE ENCOUNTER
LMOR to call Belkis tomorrow as I will be leaving for the day soon and am off tomorrow. Informed her that it was not urgent.

## 2020-03-30 ENCOUNTER — TELEPHONE (OUTPATIENT)
Dept: FAMILY MEDICINE | Facility: CLINIC | Age: 80
End: 2020-03-30

## 2020-05-04 ENCOUNTER — OFFICE VISIT (OUTPATIENT)
Dept: FAMILY MEDICINE | Facility: CLINIC | Age: 80
End: 2020-05-04
Payer: MEDICARE

## 2020-05-04 ENCOUNTER — TELEPHONE (OUTPATIENT)
Dept: FAMILY MEDICINE | Facility: CLINIC | Age: 80
End: 2020-05-04

## 2020-05-04 VITALS
BODY MASS INDEX: 26.62 KG/M2 | WEIGHT: 141 LBS | HEART RATE: 64 BPM | SYSTOLIC BLOOD PRESSURE: 138 MMHG | HEIGHT: 61 IN | DIASTOLIC BLOOD PRESSURE: 60 MMHG | TEMPERATURE: 98 F

## 2020-05-04 DIAGNOSIS — G45.9 TIA (TRANSIENT ISCHEMIC ATTACK): ICD-10-CM

## 2020-05-04 DIAGNOSIS — Z79.899 HIGH RISK MEDICATION USE: ICD-10-CM

## 2020-05-04 DIAGNOSIS — K21.9 GASTROESOPHAGEAL REFLUX DISEASE, ESOPHAGITIS PRESENCE NOT SPECIFIED: ICD-10-CM

## 2020-05-04 DIAGNOSIS — F41.9 ANXIETY: ICD-10-CM

## 2020-05-04 DIAGNOSIS — E78.5 HYPERLIPIDEMIA, UNSPECIFIED HYPERLIPIDEMIA TYPE: ICD-10-CM

## 2020-05-04 DIAGNOSIS — N28.9 FUNCTION KIDNEY DECREASED: ICD-10-CM

## 2020-05-04 DIAGNOSIS — G25.81 RLS (RESTLESS LEGS SYNDROME): ICD-10-CM

## 2020-05-04 DIAGNOSIS — E03.9 HYPOTHYROIDISM, UNSPECIFIED TYPE: Primary | ICD-10-CM

## 2020-05-04 DIAGNOSIS — G47.00 INSOMNIA, UNSPECIFIED TYPE: ICD-10-CM

## 2020-05-04 PROCEDURE — 99214 OFFICE O/P EST MOD 30 MIN: CPT | Mod: S$GLB,,, | Performed by: NURSE PRACTITIONER

## 2020-05-04 PROCEDURE — 99214 PR OFFICE/OUTPT VISIT, EST, LEVL IV, 30-39 MIN: ICD-10-PCS | Mod: S$GLB,,, | Performed by: NURSE PRACTITIONER

## 2020-05-04 RX ORDER — ROSUVASTATIN CALCIUM 10 MG/1
1 TABLET, COATED ORAL NIGHTLY
COMMUNITY
Start: 2020-02-02 | End: 2020-11-03 | Stop reason: SDUPTHER

## 2020-05-04 RX ORDER — TRAZODONE HYDROCHLORIDE 50 MG/1
50 TABLET ORAL NIGHTLY
Qty: 30 TABLET | Refills: 11 | Status: SHIPPED | OUTPATIENT
Start: 2020-05-04 | End: 2020-09-23

## 2020-05-04 NOTE — PROGRESS NOTES
SUBJECTIVE:    Patient ID: Hailey Mariano is a 80 y.o. female.    Chief Complaint: Medication Refill (wants new medication, brought bottles// SW)    80-year-old female presents for check up.  Reports overall doing okay.  Recently had thyroid biopsy performed in ENT office.  Biopsy results showed benign thyroid lesions. Patient quit lisinopril. Has been checking blood pressure at home. Readings at home have been <130/90. Was taken off of lisinopril earlier this year. Since then cough has resolved.  .  Concerned that may be related to lisinopril.    Last labs did show decreased kidney function.  Patient is prepared to repeat kidney function today.  Patient does report at that time was taken NSAIDs on a daily basis.  However since kidney function was decreased on last labs patient has stopped.  Not sleeping well. Reports previously took trazodone. Thinks it worked well. Would like to restart.       Office Visit on 01/02/2020   Component Date Value Ref Range Status    Glucose 01/02/2020 77  65 - 139 mg/dL Final    BUN, Bld 01/02/2020 17  7 - 25 mg/dL Final    Creatinine 01/02/2020 1.14* 0.60 - 0.93 mg/dL Final    eGFR if non African American 01/02/2020 46* > OR = 60 mL/min/1.73m2 Final    eGFR if African American 01/02/2020 53* > OR = 60 mL/min/1.73m2 Final    BUN/Creatinine Ratio 01/02/2020 15  6 - 22 (calc) Final    Sodium 01/02/2020 144  135 - 146 mmol/L Final    Potassium 01/02/2020 3.3* 3.5 - 5.3 mmol/L Final    Chloride 01/02/2020 106  98 - 110 mmol/L Final    CO2 01/02/2020 27  20 - 32 mmol/L Final    Calcium 01/02/2020 8.8  8.6 - 10.4 mg/dL Final    Total Protein 01/02/2020 6.4  6.1 - 8.1 g/dL Final    Albumin 01/02/2020 3.9  3.6 - 5.1 g/dL Final    Globulin, Total 01/02/2020 2.5  1.9 - 3.7 g/dL (calc) Final    Albumin/Globulin Ratio 01/02/2020 1.6  1.0 - 2.5 (calc) Final    Total Bilirubin 01/02/2020 0.4  0.2 - 1.2 mg/dL Final    Alkaline Phosphatase 01/02/2020 40  33 - 130 U/L Final     AST 01/02/2020 17  10 - 35 U/L Final    ALT 01/02/2020 10  6 - 29 U/L Final    Color, UA 01/02/2020 DARK YELLOW  YELLOW Final    Appearance, UA 01/02/2020 CLEAR  CLEAR Final    Specific [place], UA 01/02/2020 1.024  1.001 - 1.035 Final    pH, UA 01/02/2020 < OR = 5.0  5.0 - 8.0 Final    Glucose, UA 01/02/2020 NEGATIVE  NEGATIVE Final    Bilirubin, UA 01/02/2020 NEGATIVE  NEGATIVE Final    Ketones, UA 01/02/2020 TRACE* NEGATIVE Final    Occult Blood UA 01/02/2020 NEGATIVE  NEGATIVE Final    Protein, UA 01/02/2020 NEGATIVE  NEGATIVE Final    Nitrite, UA 01/02/2020 NEGATIVE  NEGATIVE Final    Leukocytes, UA 01/02/2020 1+* NEGATIVE Final    Creatinine, Random Ur 01/02/2020 331* 20 - 275 mg/dL Final    Microalb, Ur 01/02/2020 2.2  See Note: mg/dL Final    Microalb Creat Ratio 01/02/2020 7  <30 mcg/mg creat Final   Lab Visit on 11/14/2019   Component Date Value Ref Range Status    Cholesterol 11/14/2019 188  120 - 199 mg/dL Final    Triglycerides 11/14/2019 178* 30 - 150 mg/dL Final    HDL 11/14/2019 45  40 - 75 mg/dL Final    LDL Cholesterol 11/14/2019 107.4  63.0 - 159.0 mg/dL Final    Hdl/Cholesterol Ratio 11/14/2019 23.9  20.0 - 50.0 % Final    Total Cholesterol/HDL Ratio 11/14/2019 4.2  2.0 - 5.0 Final    Non-HDL Cholesterol 11/14/2019 143  mg/dL Final    AST 11/14/2019 19  10 - 40 U/L Final    ALT 11/14/2019 16  10 - 44 U/L Final       Past Medical History:   Diagnosis Date    Anxiety     Depression     Diverticulitis     GERD (gastroesophageal reflux disease)     Hyperlipidemia     Hypertension     Ovarian cancer 2002    TIA (transient ischemic attack)      Past Surgical History:   Procedure Laterality Date    HERNIA REPAIR      HYSTERECTOMY       History reviewed. No pertinent family history.    Marital Status:   Alcohol History:  reports that she drank about 1.0 standard drinks of alcohol per week.  Tobacco History:  reports that she has never smoked. She has never used  smokeless tobacco.  Drug History:  has no drug history on file.    Review of patient's allergies indicates:   Allergen Reactions    Flagyl [metronidazole hcl]     Morphine     Pcn [penicillins]     Sulfa (sulfonamide antibiotics)        Current Outpatient Medications:     amLODIPine (NORVASC) 5 MG tablet, Take 5 mg by mouth once daily., Disp: , Rfl:     aspirin (ECOTRIN) 81 MG EC tablet, Take 81 mg by mouth once daily., Disp: , Rfl:     clopidogrel (PLAVIX) 75 mg tablet, Take 75 mg by mouth once daily., Disp: , Rfl:     multivit-minerals/folic acid (SPECTRAVITE ADULT ORAL), Take by mouth., Disp: , Rfl:     pantoprazole (PROTONIX) 40 MG tablet, Take 1 tablet (40 mg total) by mouth once daily., Disp: 90 tablet, Rfl: 1    pravastatin (PRAVACHOL) 40 MG tablet, Take 1 tablet (40 mg total) by mouth once daily. For cholesterol, Disp: 90 tablet, Rfl: 1    rosuvastatin (CRESTOR) 10 MG tablet, Take 1 tablet by mouth every evening., Disp: , Rfl:     sertraline (ZOLOFT) 50 MG tablet, Take 50 mg by mouth once daily., Disp: , Rfl:     traZODone (DESYREL) 50 MG tablet, Take 1 tablet (50 mg total) by mouth every evening., Disp: 30 tablet, Rfl: 11    Review of Systems   Constitutional: Negative for chills, fever and unexpected weight change.   HENT: Negative for ear pain, rhinorrhea and sore throat.    Eyes: Negative for pain.   Respiratory: Negative for cough and shortness of breath.    Cardiovascular: Negative for chest pain, palpitations and leg swelling.   Gastrointestinal: Negative for abdominal pain, diarrhea, nausea and vomiting.   Genitourinary: Negative for difficulty urinating, hematuria and vaginal bleeding.   Musculoskeletal: Negative for arthralgias.   Skin: Negative for rash.   Neurological: Negative for dizziness, weakness and headaches.   Psychiatric/Behavioral: Positive for sleep disturbance. Negative for agitation. The patient is not nervous/anxious.           Objective:      Vitals:    05/04/20 1152  "  BP: 138/60   Pulse: 64   Temp: 97.8 °F (36.6 °C)   Weight: 64 kg (141 lb)   Height: 5' 1" (1.549 m)     Body mass index is 26.64 kg/m².  Physical Exam   Constitutional: She is oriented to person, place, and time. She appears well-developed and well-nourished.   HENT:   Right Ear: External ear normal.   Left Ear: External ear normal.   Mouth/Throat: Oropharynx is clear and moist.   Neck: Normal range of motion. Neck supple. No JVD present.   Cardiovascular: Normal rate and regular rhythm.   No murmur heard.  Pulmonary/Chest: Effort normal and breath sounds normal.   Abdominal: Soft. Bowel sounds are normal.   Musculoskeletal: Normal range of motion. She exhibits no edema or deformity.   Lymphadenopathy:     She has no cervical adenopathy.   Neurological: She is alert and oriented to person, place, and time. Gait normal.   Skin: Skin is warm, dry and intact. No rash noted.   Psychiatric: She has a normal mood and affect. Her speech is normal and behavior is normal.         Assessment:       1. Hypothyroidism, unspecified type    2. Anxiety    3. Function kidney decreased    4. Hyperlipidemia, unspecified hyperlipidemia type    5. TIA (transient ischemic attack)    6. Gastroesophageal reflux disease, esophagitis presence not specified    7. Insomnia, unspecified type    8. High risk medication use    9. RLS (restless legs syndrome)         Plan:       Hypothyroidism, unspecified type  -     TSH w/reflex to FT4; Future; Expected date: 05/04/2020    Anxiety  -     CBC auto differential; Future; Expected date: 05/04/2020    Function kidney decreased  -     CBC auto differential; Future; Expected date: 05/04/2020  -     Comprehensive metabolic panel; Future; Expected date: 05/04/2020  -     Lipid Panel; Future; Expected date: 05/04/2020  -     TSH w/reflex to FT4; Future; Expected date: 05/04/2020    Hyperlipidemia, unspecified hyperlipidemia type  -     Lipid Panel; Future; Expected date: 05/04/2020    TIA (transient " ischemic attack)    Gastroesophageal reflux disease, esophagitis presence not specified  -     Urinalysis, Reflex to Urine Culture Urine, Clean Catch; Future; Expected date: 05/04/2020    Insomnia, unspecified type  -     traZODone (DESYREL) 50 MG tablet; Take 1 tablet (50 mg total) by mouth every evening.  Dispense: 30 tablet; Refill: 11  -     Urinalysis, Reflex to Urine Culture Urine, Clean Catch; Future; Expected date: 05/04/2020    High risk medication use  -     CBC auto differential; Future; Expected date: 05/04/2020  -     Lipid Panel; Future; Expected date: 05/04/2020  -     TSH w/reflex to FT4; Future; Expected date: 05/04/2020  -     Microalbumin/creatinine urine ratio; Future; Expected date: 05/04/2020  -     Urinalysis, Reflex to Urine Culture Urine, Clean Catch; Future; Expected date: 05/04/2020  -     Magnesium; Future; Expected date: 05/04/2020    RLS (restless legs syndrome)  -     Magnesium; Future; Expected date: 05/04/2020      Follow up in about 3 months (around 8/4/2020) for medication management.

## 2020-05-04 NOTE — TELEPHONE ENCOUNTER
Pt wants trazodone. We have never prescribed this for her. Pt scheduled for an appt with Linnette

## 2020-05-04 NOTE — TELEPHONE ENCOUNTER
----- Message from Ethel Mendoza sent at 5/4/2020 10:37 AM CDT -----  vm @ refill on a medication that she said is not on her med list wants the nurse to call her. [phone]

## 2020-09-14 DIAGNOSIS — E78.5 HYPERLIPIDEMIA, UNSPECIFIED HYPERLIPIDEMIA TYPE: ICD-10-CM

## 2020-09-14 DIAGNOSIS — K21.9 GASTROESOPHAGEAL REFLUX DISEASE, ESOPHAGITIS PRESENCE NOT SPECIFIED: ICD-10-CM

## 2020-09-14 DIAGNOSIS — F41.9 ANXIETY: Primary | ICD-10-CM

## 2020-09-14 DIAGNOSIS — F32.A DEPRESSION, UNSPECIFIED DEPRESSION TYPE: ICD-10-CM

## 2020-09-14 RX ORDER — PRAVASTATIN SODIUM 40 MG/1
40 TABLET ORAL DAILY
Qty: 90 TABLET | Refills: 1 | Status: SHIPPED | OUTPATIENT
Start: 2020-09-14 | End: 2020-11-23

## 2020-09-14 RX ORDER — PANTOPRAZOLE SODIUM 40 MG/1
40 TABLET, DELAYED RELEASE ORAL DAILY
Qty: 90 TABLET | Refills: 1 | Status: SHIPPED | OUTPATIENT
Start: 2020-09-14 | End: 2021-03-17 | Stop reason: SDUPTHER

## 2020-09-14 RX ORDER — SERTRALINE HYDROCHLORIDE 50 MG/1
50 TABLET, FILM COATED ORAL DAILY
Qty: 90 TABLET | Refills: 1 | Status: SHIPPED | OUTPATIENT
Start: 2020-09-14 | End: 2021-03-17 | Stop reason: SDUPTHER

## 2020-09-14 NOTE — TELEPHONE ENCOUNTER
----- Message from Bindu Capone sent at 9/14/2020 10:34 AM CDT -----  VM 10:33 Refills and she wants to change her pharmacy. I tried to call the patient back to get some information and I had to LMOR .  PT'S # [phone]  GH

## 2020-09-23 ENCOUNTER — OFFICE VISIT (OUTPATIENT)
Dept: FAMILY MEDICINE | Facility: CLINIC | Age: 80
End: 2020-09-23
Payer: MEDICARE

## 2020-09-23 VITALS
DIASTOLIC BLOOD PRESSURE: 80 MMHG | HEART RATE: 68 BPM | TEMPERATURE: 98 F | HEIGHT: 61 IN | WEIGHT: 139 LBS | SYSTOLIC BLOOD PRESSURE: 124 MMHG | BODY MASS INDEX: 26.24 KG/M2

## 2020-09-23 DIAGNOSIS — F41.9 ANXIETY: ICD-10-CM

## 2020-09-23 DIAGNOSIS — N28.9 FUNCTION KIDNEY DECREASED: ICD-10-CM

## 2020-09-23 DIAGNOSIS — E78.5 HYPERLIPIDEMIA, UNSPECIFIED HYPERLIPIDEMIA TYPE: ICD-10-CM

## 2020-09-23 DIAGNOSIS — E03.9 HYPOTHYROIDISM, UNSPECIFIED TYPE: ICD-10-CM

## 2020-09-23 DIAGNOSIS — Z79.899 HIGH RISK MEDICATION USE: ICD-10-CM

## 2020-09-23 DIAGNOSIS — G47.00 INSOMNIA, UNSPECIFIED TYPE: ICD-10-CM

## 2020-09-23 DIAGNOSIS — Z23 NEED FOR INFLUENZA VACCINATION: Primary | ICD-10-CM

## 2020-09-23 PROCEDURE — G0008 ADMIN INFLUENZA VIRUS VAC: HCPCS | Mod: S$GLB,,, | Performed by: NURSE PRACTITIONER

## 2020-09-23 PROCEDURE — 99214 OFFICE O/P EST MOD 30 MIN: CPT | Mod: 25,S$GLB,, | Performed by: NURSE PRACTITIONER

## 2020-09-23 PROCEDURE — 90662 FLU VACCINE - QUADRIVALENT - HIGH DOSE (65+) PRESERVATIVE FREE IM: ICD-10-PCS | Mod: S$GLB,,, | Performed by: NURSE PRACTITIONER

## 2020-09-23 PROCEDURE — G0008 FLU VACCINE - QUADRIVALENT - HIGH DOSE (65+) PRESERVATIVE FREE IM: ICD-10-PCS | Mod: S$GLB,,, | Performed by: NURSE PRACTITIONER

## 2020-09-23 PROCEDURE — 99214 PR OFFICE/OUTPT VISIT, EST, LEVL IV, 30-39 MIN: ICD-10-PCS | Mod: 25,S$GLB,, | Performed by: NURSE PRACTITIONER

## 2020-09-23 PROCEDURE — 90662 IIV NO PRSV INCREASED AG IM: CPT | Mod: S$GLB,,, | Performed by: NURSE PRACTITIONER

## 2020-09-23 NOTE — PROGRESS NOTES
SUBJECTIVE:    Patient ID: Hailey Mariano is a 80 y.o. female.    Chief Complaint: Follow-up (- brought bottles - denied dexa - tk)    80-year-old female presents for check up.  Reports overall doing okay.  . Readings at home have been <130/90. Was taken off of lisinopril earlier this year. Since then cough has resolved.  .  Concerned that may be related to lisinopril.    Last labs did show decreased kidney function.  Patient is prepared to repeat kidney function today.  Patient does report at that time was taken NSAIDs on a daily basis.  However since kidney function was decreased on last labs patient has stopped. Sleeping better.       Office Visit on 05/04/2020   Component Date Value Ref Range Status    WBC 09/24/2020 7.2  3.8 - 10.8 Thousand/uL Final    RBC 09/24/2020 4.44  3.80 - 5.10 Million/uL Final    Hemoglobin 09/24/2020 12.7  11.7 - 15.5 g/dL Final    Hematocrit 09/24/2020 38.7  35.0 - 45.0 % Final    MCV 09/24/2020 87.2  80.0 - 100.0 fL Final    MCH 09/24/2020 28.6  27.0 - 33.0 pg Final    MCHC 09/24/2020 32.8  32.0 - 36.0 g/dL Final    RDW 09/24/2020 12.6  11.0 - 15.0 % Final    Platelets 09/24/2020 213  140 - 400 Thousand/uL Final    MPV 09/24/2020 10.8  7.5 - 12.5 fL Final    Neutrophils Absolute 09/24/2020 4,687  1,500 - 7,800 cells/uL Final    Lymph # 09/24/2020 1,692  850 - 3,900 cells/uL Final    Mono # 09/24/2020 540  200 - 950 cells/uL Final    Eos # 09/24/2020 238  15 - 500 cells/uL Final    Baso # 09/24/2020 43  0 - 200 cells/uL Final    Neutrophils Relative 09/24/2020 65.1  % Final    Lymph % 09/24/2020 23.5  % Final    Mono % 09/24/2020 7.5  % Final    Eosinophil % 09/24/2020 3.3  % Final    Basophil % 09/24/2020 0.6  % Final    Glucose 09/24/2020 103* 65 - 99 mg/dL Final    BUN 09/24/2020 18  7 - 25 mg/dL Final    Creatinine 09/24/2020 1.08* 0.60 - 0.88 mg/dL Final    eGFR if non  09/24/2020 48* > OR = 60 mL/min/1.73m2 Final    eGFR if African  American 09/24/2020 56* > OR = 60 mL/min/1.73m2 Final    BUN/Creatinine Ratio 09/24/2020 17  6 - 22 (calc) Final    Sodium 09/24/2020 142  135 - 146 mmol/L Final    Potassium 09/24/2020 3.8  3.5 - 5.3 mmol/L Final    Chloride 09/24/2020 103  98 - 110 mmol/L Final    CO2 09/24/2020 30  20 - 32 mmol/L Final    Calcium 09/24/2020 9.5  8.6 - 10.4 mg/dL Final    Total Protein 09/24/2020 7.1  6.1 - 8.1 g/dL Final    Albumin 09/24/2020 4.6  3.6 - 5.1 g/dL Final    Globulin, Total 09/24/2020 2.5  1.9 - 3.7 g/dL (calc) Final    Albumin/Globulin Ratio 09/24/2020 1.8  1.0 - 2.5 (calc) Final    Total Bilirubin 09/24/2020 0.6  0.2 - 1.2 mg/dL Final    Alkaline Phosphatase 09/24/2020 45  37 - 153 U/L Final    AST 09/24/2020 24  10 - 35 U/L Final    ALT 09/24/2020 17  6 - 29 U/L Final    Cholesterol 09/24/2020 146  <200 mg/dL Final    HDL 09/24/2020 55  > OR = 50 mg/dL Final    Triglycerides 09/24/2020 142  <150 mg/dL Final    LDL Cholesterol 09/24/2020 68  mg/dL (calc) Final    HDL/Cholesterol Ratio 09/24/2020 2.7  <5.0 (calc) Final    Non HDL Chol. (LDL+VLDL) 09/24/2020 91  <130 mg/dL (calc) Final    TSH w/reflex to FT4 09/24/2020 4.22  0.40 - 4.50 mIU/L Final    Creatinine, Urine 09/24/2020 102  20 - 275 mg/dL Final    Microalb, Ur 09/24/2020 0.7  See Note: mg/dL Final    Microalb/Creat Ratio 09/24/2020 7  <30 mcg/mg creat Final    Color, UA 09/24/2020 YELLOW  YELLOW Final    Appearance, UA 09/24/2020 CLEAR  CLEAR Final    Specific [place], UA 09/24/2020 1.018  1.001 - 1.035 Final    pH, UA 09/24/2020 5.5  5.0 - 8.0 Final    Glucose, UA 09/24/2020 NEGATIVE  NEGATIVE Final    Bilirubin, UA 09/24/2020 NEGATIVE  NEGATIVE Final    Ketones, UA 09/24/2020 NEGATIVE  NEGATIVE Final    Occult Blood UA 09/24/2020 NEGATIVE  NEGATIVE Final    Protein, UA 09/24/2020 NEGATIVE  NEGATIVE Final    Nitrite, UA 09/24/2020 NEGATIVE  NEGATIVE Final    Leukocytes, UA 09/24/2020 1+* NEGATIVE Final    WBC Casts, UA  09/24/2020 0-5  < OR = 5 /HPF Final    RBC Casts, UA 09/24/2020 NONE SEEN  < OR = 2 /HPF Final    Squam Epithel, UA 09/24/2020 NONE SEEN  < OR = 5 /HPF Final    Bacteria, UA 09/24/2020 NONE SEEN  NONE SEEN /HPF Final    Hyaline Casts, UA 09/24/2020 NONE SEEN  NONE SEEN /LPF Final    Reflexive Urine Culture 09/24/2020 CULTURE INDICATED - RESULTS TO FOLLOW   Final    Magnesium 09/24/2020 1.6  1.5 - 2.5 mg/dL Final    Urine Culture, Routine 09/24/2020    Final       Past Medical History:   Diagnosis Date    Anxiety     Depression     Diverticulitis     GERD (gastroesophageal reflux disease)     Hyperlipidemia     Hypertension     Ovarian cancer 2002    TIA (transient ischemic attack)      Social History     Socioeconomic History    Marital status:      Spouse name: Not on file    Number of children: Not on file    Years of education: Not on file    Highest education level: Not on file   Occupational History    Not on file   Social Needs    Financial resource strain: Not on file    Food insecurity     Worry: Not on file     Inability: Not on file    Transportation needs     Medical: Not on file     Non-medical: Not on file   Tobacco Use    Smoking status: Never Smoker    Smokeless tobacco: Never Used   Substance and Sexual Activity    Alcohol use: Not Currently     Alcohol/week: 1.0 standard drinks     Types: 1 Glasses of wine per week    Drug use: Not on file    Sexual activity: Not on file   Lifestyle    Physical activity     Days per week: Not on file     Minutes per session: Not on file    Stress: Not on file   Relationships    Social connections     Talks on phone: Not on file     Gets together: Not on file     Attends [employer] service: Not on file     Active member of club or organization: Not on file     Attends meetings of clubs or organizations: Not on file     Relationship status: Not on file   Other Topics Concern    Not on file   Social History Narrative    Not on  "file     Past Surgical History:   Procedure Laterality Date    HERNIA REPAIR      HYSTERECTOMY       History reviewed. No pertinent family history.    Review of patient's allergies indicates:   Allergen Reactions    Flagyl [metronidazole hcl]     Morphine     Pcn [penicillins]     Sulfa (sulfonamide antibiotics)        Current Outpatient Medications:     aspirin (ECOTRIN) 81 MG EC tablet, Take 81 mg by mouth once daily., Disp: , Rfl:     pantoprazole (PROTONIX) 40 MG tablet, Take 1 tablet (40 mg total) by mouth once daily., Disp: 90 tablet, Rfl: 1    sertraline (ZOLOFT) 50 MG tablet, Take 1 tablet (50 mg total) by mouth once daily., Disp: 90 tablet, Rfl: 1    amLODIPine (NORVASC) 5 MG tablet, Take 1 tablet (5 mg total) by mouth once daily., Disp: 90 tablet, Rfl: 1    clopidogreL (PLAVIX) 75 mg tablet, Take 1 tablet (75 mg total) by mouth once daily., Disp: 90 tablet, Rfl: 1    rosuvastatin (CRESTOR) 10 MG tablet, Take 1 tablet (10 mg total) by mouth once daily., Disp: 90 tablet, Rfl: 1    Review of Systems   Constitutional: Negative for chills, fever and unexpected weight change.   HENT: Negative for ear pain, rhinorrhea and sore throat.    Eyes: Negative for pain and visual disturbance.   Respiratory: Negative for cough and shortness of breath.    Cardiovascular: Negative for chest pain, palpitations and leg swelling.   Gastrointestinal: Negative for abdominal pain, diarrhea, nausea and vomiting.   Genitourinary: Negative for difficulty urinating, hematuria and vaginal bleeding.   Musculoskeletal: Negative for arthralgias.   Skin: Negative for rash.   Neurological: Negative for dizziness, weakness and headaches.   Psychiatric/Behavioral: Negative for agitation and sleep disturbance. The patient is not nervous/anxious.           Objective:      Vitals:    09/23/20 1056   BP: 124/80   Pulse: 68   Temp: 97.6 °F (36.4 °C)   Weight: 63 kg (139 lb)   Height: 5' 1" (1.549 m)     Physical Exam  Constitutional:  "      Appearance: She is well-developed.   HENT:      Head: Normocephalic.      Right Ear: External ear normal.      Left Ear: External ear normal.   Eyes:      Conjunctiva/sclera: Conjunctivae normal.      Pupils: Pupils are equal, round, and reactive to light.   Neck:      Musculoskeletal: Normal range of motion and neck supple.      Vascular: No JVD.   Cardiovascular:      Rate and Rhythm: Normal rate and regular rhythm.      Heart sounds: No murmur.   Pulmonary:      Effort: Pulmonary effort is normal.      Breath sounds: Normal breath sounds.   Abdominal:      General: Bowel sounds are normal.      Palpations: Abdomen is soft.   Musculoskeletal: Normal range of motion.         General: No deformity.   Lymphadenopathy:      Cervical: No cervical adenopathy.   Skin:     General: Skin is warm and dry.      Findings: No rash.   Neurological:      Mental Status: She is alert and oriented to person, place, and time.      Gait: Gait normal.   Psychiatric:         Speech: Speech normal.         Behavior: Behavior normal.           Assessment:       1. Need for influenza vaccination    2. Hypothyroidism, unspecified type    3. Anxiety    4. Hyperlipidemia, unspecified hyperlipidemia type    5. Function kidney decreased    6. Insomnia, unspecified type    7. High risk medication use         Plan:       Need for influenza vaccination  -     Influenza - Quadrivalent - High Dose (65+) (PF) (IM)    Hypothyroidism, unspecified type    Anxiety    Hyperlipidemia, unspecified hyperlipidemia type    Function kidney decreased    Insomnia, unspecified type    High risk medication use      Follow up in about 6 months (around 3/23/2021) for medication management.        11/23/2020 Linnette Ghotra

## 2020-09-25 LAB
ALBUMIN SERPL-MCNC: 4.6 G/DL (ref 3.6–5.1)
ALBUMIN/GLOB SERPL: 1.8 (CALC) (ref 1–2.5)
ALP SERPL-CCNC: 45 U/L (ref 37–153)
ALT SERPL-CCNC: 17 U/L (ref 6–29)
AST SERPL-CCNC: 24 U/L (ref 10–35)
BASOPHILS # BLD AUTO: 43 CELLS/UL (ref 0–200)
BASOPHILS NFR BLD AUTO: 0.6 %
BILIRUB SERPL-MCNC: 0.6 MG/DL (ref 0.2–1.2)
BUN SERPL-MCNC: 18 MG/DL (ref 7–25)
BUN/CREAT SERPL: 17 (CALC) (ref 6–22)
CALCIUM SERPL-MCNC: 9.5 MG/DL (ref 8.6–10.4)
CHLORIDE SERPL-SCNC: 103 MMOL/L (ref 98–110)
CHOLEST SERPL-MCNC: 146 MG/DL
CHOLEST/HDLC SERPL: 2.7 (CALC)
CO2 SERPL-SCNC: 30 MMOL/L (ref 20–32)
CREAT SERPL-MCNC: 1.08 MG/DL (ref 0.6–0.88)
EOSINOPHIL # BLD AUTO: 238 CELLS/UL (ref 15–500)
EOSINOPHIL NFR BLD AUTO: 3.3 %
ERYTHROCYTE [DISTWIDTH] IN BLOOD BY AUTOMATED COUNT: 12.6 % (ref 11–15)
GFRSERPLBLD MDRD-ARVRAT: 48 ML/MIN/1.73M2
GLOBULIN SER CALC-MCNC: 2.5 G/DL (CALC) (ref 1.9–3.7)
GLUCOSE SERPL-MCNC: 103 MG/DL (ref 65–99)
HCT VFR BLD AUTO: 38.7 % (ref 35–45)
HDLC SERPL-MCNC: 55 MG/DL
HGB BLD-MCNC: 12.7 G/DL (ref 11.7–15.5)
LDLC SERPL CALC-MCNC: 68 MG/DL (CALC)
LYMPHOCYTES # BLD AUTO: 1692 CELLS/UL (ref 850–3900)
LYMPHOCYTES NFR BLD AUTO: 23.5 %
MAGNESIUM SERPL-MCNC: 1.6 MG/DL (ref 1.5–2.5)
MCH RBC QN AUTO: 28.6 PG (ref 27–33)
MCHC RBC AUTO-ENTMCNC: 32.8 G/DL (ref 32–36)
MCV RBC AUTO: 87.2 FL (ref 80–100)
MONOCYTES # BLD AUTO: 540 CELLS/UL (ref 200–950)
MONOCYTES NFR BLD AUTO: 7.5 %
NEUTROPHILS # BLD AUTO: 4687 CELLS/UL (ref 1500–7800)
NEUTROPHILS NFR BLD AUTO: 65.1 %
NONHDLC SERPL-MCNC: 91 MG/DL (CALC)
PLATELET # BLD AUTO: 213 THOUSAND/UL (ref 140–400)
PMV BLD REES-ECKER: 10.8 FL (ref 7.5–12.5)
POTASSIUM SERPL-SCNC: 3.8 MMOL/L (ref 3.5–5.3)
PROT SERPL-MCNC: 7.1 G/DL (ref 6.1–8.1)
RBC # BLD AUTO: 4.44 MILLION/UL (ref 3.8–5.1)
SODIUM SERPL-SCNC: 142 MMOL/L (ref 135–146)
TRIGL SERPL-MCNC: 142 MG/DL
TSH SERPL-ACNC: 4.22 MIU/L (ref 0.4–4.5)
WBC # BLD AUTO: 7.2 THOUSAND/UL (ref 3.8–10.8)

## 2020-09-26 LAB
ALBUMIN/CREAT UR: 7 MCG/MG CREAT
APPEARANCE UR: CLEAR
BACTERIA #/AREA URNS HPF: ABNORMAL /HPF
BACTERIA UR CULT: ABNORMAL
BACTERIA UR CULT: NORMAL
BILIRUB UR QL STRIP: NEGATIVE
COLOR UR: YELLOW
CREAT UR-MCNC: 102 MG/DL (ref 20–275)
GLUCOSE UR QL STRIP: NEGATIVE
HGB UR QL STRIP: NEGATIVE
HYALINE CASTS #/AREA URNS LPF: ABNORMAL /LPF
KETONES UR QL STRIP: NEGATIVE
LEUKOCYTE ESTERASE UR QL STRIP: ABNORMAL
MICROALBUMIN UR-MCNC: 0.7 MG/DL
NITRITE UR QL STRIP: NEGATIVE
PH UR STRIP: 5.5 [PH] (ref 5–8)
PROT UR QL STRIP: NEGATIVE
RBC #/AREA URNS HPF: ABNORMAL /HPF
SP GR UR STRIP: 1.02 (ref 1–1.03)
SQUAMOUS #/AREA URNS HPF: ABNORMAL /HPF
WBC #/AREA URNS HPF: ABNORMAL /HPF

## 2020-09-29 ENCOUNTER — TELEPHONE (OUTPATIENT)
Dept: FAMILY MEDICINE | Facility: CLINIC | Age: 80
End: 2020-09-29

## 2020-09-29 NOTE — TELEPHONE ENCOUNTER
----- Message from Linnette Ghotra NP sent at 9/28/2020 11:17 PM CDT -----  Kidney function is minimally decreased. Slightly improved since previous labs. The rest of the labs are within normal limits.

## 2020-10-15 RX ORDER — CLOPIDOGREL BISULFATE 75 MG/1
75 TABLET ORAL DAILY
Qty: 90 TABLET | Refills: 1 | Status: SHIPPED | OUTPATIENT
Start: 2020-10-15 | End: 2021-03-29 | Stop reason: SDUPTHER

## 2020-10-15 NOTE — TELEPHONE ENCOUNTER
----- Message from Mariann Resendiz sent at 10/15/2020 12:27 PM CDT -----  Regarding: refills  Clopidogrel   [facility]   Pt [phone]

## 2020-11-04 RX ORDER — ROSUVASTATIN CALCIUM 10 MG/1
10 TABLET, COATED ORAL NIGHTLY
Qty: 90 TABLET | Refills: 3 | Status: SHIPPED | OUTPATIENT
Start: 2020-11-04 | End: 2020-11-06

## 2020-11-06 DIAGNOSIS — E78.5 HYPERLIPIDEMIA, UNSPECIFIED HYPERLIPIDEMIA TYPE: Primary | ICD-10-CM

## 2020-11-06 DIAGNOSIS — I10 HYPERTENSION, UNSPECIFIED TYPE: ICD-10-CM

## 2020-11-06 NOTE — TELEPHONE ENCOUNTER
----- Message from Mariann Resendiz sent at 11/6/2020 11:01 AM CST -----  Regarding: refills  Amlodipine   Rosuvastatin   [facility]   Pt [phone]

## 2020-11-08 RX ORDER — ROSUVASTATIN CALCIUM 10 MG/1
10 TABLET, COATED ORAL DAILY
Qty: 90 TABLET | Refills: 1 | Status: SHIPPED | OUTPATIENT
Start: 2020-11-08 | End: 2021-03-29

## 2020-11-08 RX ORDER — AMLODIPINE BESYLATE 5 MG/1
5 TABLET ORAL DAILY
Qty: 90 TABLET | Refills: 1 | Status: SHIPPED | OUTPATIENT
Start: 2020-11-08 | End: 2021-03-29 | Stop reason: SDUPTHER

## 2020-11-23 ENCOUNTER — TELEPHONE (OUTPATIENT)
Dept: FAMILY MEDICINE | Facility: CLINIC | Age: 80
End: 2020-11-23

## 2020-11-23 NOTE — TELEPHONE ENCOUNTER
Spoke to pharmacy regarding message below. Pharmacist wanted to know which med was suppose to be on. Rosuvastatin 10mg and Pravastatin 40mg were both filed.l Let pharmacist know I will check with the pt to see which one she's taking.

## 2020-11-23 NOTE — TELEPHONE ENCOUNTER
Spoke to pt regarding message below. Pt stated she doesn't remember which one she is suppose to be taking because she threw the med bottle in the trash. Let pt know I will hold message for Linnette tomorrow

## 2020-11-23 NOTE — TELEPHONE ENCOUNTER
----- Message from Mariann Resendiz sent at 11/23/2020  3:27 PM CST -----  Regarding: needing call back  [facility]   [facility] [phone] ref [phone]

## 2020-11-24 NOTE — TELEPHONE ENCOUNTER
[facility] to let him know that pt should be taking crestor 10mg . Pharmacist verbalized understanding.

## 2020-11-24 NOTE — TELEPHONE ENCOUNTER
LMOR to let pt know she should be taking crestor 10mg to give us a call back if she has any questions

## 2021-02-19 ENCOUNTER — IMMUNIZATION (OUTPATIENT)
Dept: FAMILY MEDICINE | Facility: CLINIC | Age: 81
End: 2021-02-19
Payer: MEDICARE

## 2021-02-19 DIAGNOSIS — Z23 NEED FOR VACCINATION: Primary | ICD-10-CM

## 2021-02-19 PROCEDURE — 0001A COVID-19, MRNA, LNP-S, PF, 30 MCG/0.3 ML DOSE VACCINE: ICD-10-PCS | Mod: CV19,,, | Performed by: FAMILY MEDICINE

## 2021-02-19 PROCEDURE — 0001A COVID-19, MRNA, LNP-S, PF, 30 MCG/0.3 ML DOSE VACCINE: CPT | Mod: CV19,,, | Performed by: FAMILY MEDICINE

## 2021-02-19 PROCEDURE — 91300 COVID-19, MRNA, LNP-S, PF, 30 MCG/0.3 ML DOSE VACCINE: CPT | Mod: ,,, | Performed by: FAMILY MEDICINE

## 2021-02-19 PROCEDURE — 91300 COVID-19, MRNA, LNP-S, PF, 30 MCG/0.3 ML DOSE VACCINE: ICD-10-PCS | Mod: ,,, | Performed by: FAMILY MEDICINE

## 2021-03-12 ENCOUNTER — IMMUNIZATION (OUTPATIENT)
Dept: FAMILY MEDICINE | Facility: CLINIC | Age: 81
End: 2021-03-12
Payer: MEDICARE

## 2021-03-12 DIAGNOSIS — Z23 NEED FOR VACCINATION: Primary | ICD-10-CM

## 2021-03-12 PROCEDURE — 91300 COVID-19, MRNA, LNP-S, PF, 30 MCG/0.3 ML DOSE VACCINE: CPT | Mod: S$GLB,,, | Performed by: FAMILY MEDICINE

## 2021-03-12 PROCEDURE — 0002A COVID-19, MRNA, LNP-S, PF, 30 MCG/0.3 ML DOSE VACCINE: CPT | Mod: CV19,S$GLB,, | Performed by: FAMILY MEDICINE

## 2021-03-12 PROCEDURE — 91300 COVID-19, MRNA, LNP-S, PF, 30 MCG/0.3 ML DOSE VACCINE: ICD-10-PCS | Mod: S$GLB,,, | Performed by: FAMILY MEDICINE

## 2021-03-12 PROCEDURE — 0002A COVID-19, MRNA, LNP-S, PF, 30 MCG/0.3 ML DOSE VACCINE: ICD-10-PCS | Mod: CV19,S$GLB,, | Performed by: FAMILY MEDICINE

## 2021-03-17 DIAGNOSIS — K21.9 GASTROESOPHAGEAL REFLUX DISEASE, UNSPECIFIED WHETHER ESOPHAGITIS PRESENT: ICD-10-CM

## 2021-03-17 DIAGNOSIS — F32.A DEPRESSION, UNSPECIFIED DEPRESSION TYPE: ICD-10-CM

## 2021-03-17 DIAGNOSIS — F41.9 ANXIETY: ICD-10-CM

## 2021-03-17 DIAGNOSIS — Z79.899 ENCOUNTER FOR LONG-TERM (CURRENT) USE OF OTHER MEDICATIONS: Primary | ICD-10-CM

## 2021-03-17 RX ORDER — SERTRALINE HYDROCHLORIDE 50 MG/1
50 TABLET, FILM COATED ORAL DAILY
Qty: 90 TABLET | Refills: 1 | Status: SHIPPED | OUTPATIENT
Start: 2021-03-17 | End: 2021-03-29 | Stop reason: SDUPTHER

## 2021-03-17 RX ORDER — PANTOPRAZOLE SODIUM 40 MG/1
40 TABLET, DELAYED RELEASE ORAL DAILY
Qty: 90 TABLET | Refills: 1 | Status: SHIPPED | OUTPATIENT
Start: 2021-03-17 | End: 2021-03-29 | Stop reason: SDUPTHER

## 2021-03-29 ENCOUNTER — OFFICE VISIT (OUTPATIENT)
Dept: FAMILY MEDICINE | Facility: CLINIC | Age: 81
End: 2021-03-29
Payer: MEDICARE

## 2021-03-29 VITALS
DIASTOLIC BLOOD PRESSURE: 78 MMHG | SYSTOLIC BLOOD PRESSURE: 120 MMHG | BODY MASS INDEX: 27.75 KG/M2 | HEIGHT: 61 IN | HEART RATE: 68 BPM | WEIGHT: 147 LBS

## 2021-03-29 DIAGNOSIS — K21.9 GASTROESOPHAGEAL REFLUX DISEASE, UNSPECIFIED WHETHER ESOPHAGITIS PRESENT: ICD-10-CM

## 2021-03-29 DIAGNOSIS — Z90.49 HISTORY OF COLON RESECTION: ICD-10-CM

## 2021-03-29 DIAGNOSIS — F32.A DEPRESSION, UNSPECIFIED DEPRESSION TYPE: ICD-10-CM

## 2021-03-29 DIAGNOSIS — F41.9 ANXIETY: ICD-10-CM

## 2021-03-29 DIAGNOSIS — I10 HYPERTENSION, UNSPECIFIED TYPE: ICD-10-CM

## 2021-03-29 DIAGNOSIS — G47.00 INSOMNIA, UNSPECIFIED TYPE: Primary | ICD-10-CM

## 2021-03-29 PROCEDURE — 99214 PR OFFICE/OUTPT VISIT, EST, LEVL IV, 30-39 MIN: ICD-10-PCS | Mod: S$GLB,,, | Performed by: NURSE PRACTITIONER

## 2021-03-29 PROCEDURE — 99214 OFFICE O/P EST MOD 30 MIN: CPT | Mod: S$GLB,,, | Performed by: NURSE PRACTITIONER

## 2021-03-29 RX ORDER — PRAVASTATIN SODIUM 40 MG/1
40 TABLET ORAL NIGHTLY
COMMUNITY
End: 2021-04-05

## 2021-03-31 ENCOUNTER — TELEPHONE (OUTPATIENT)
Dept: FAMILY MEDICINE | Facility: CLINIC | Age: 81
End: 2021-03-31

## 2021-03-31 DIAGNOSIS — E03.9 HYPOTHYROIDISM, UNSPECIFIED TYPE: Primary | ICD-10-CM

## 2021-03-31 DIAGNOSIS — Z79.899 HIGH RISK MEDICATION USE: ICD-10-CM

## 2021-03-31 DIAGNOSIS — E78.5 HYPERLIPIDEMIA, UNSPECIFIED HYPERLIPIDEMIA TYPE: ICD-10-CM

## 2021-03-31 LAB
ALBUMIN SERPL-MCNC: 4.5 G/DL (ref 3.6–5.1)
ALBUMIN/GLOB SERPL: 1.8 (CALC) (ref 1–2.5)
ALP SERPL-CCNC: 52 U/L (ref 37–153)
ALT SERPL-CCNC: 13 U/L (ref 6–29)
APPEARANCE UR: CLEAR
AST SERPL-CCNC: 19 U/L (ref 10–35)
BACTERIA #/AREA URNS HPF: ABNORMAL /HPF
BACTERIA UR CULT: NORMAL
BASOPHILS # BLD AUTO: 36 CELLS/UL (ref 0–200)
BASOPHILS NFR BLD AUTO: 0.4 %
BILIRUB SERPL-MCNC: 0.3 MG/DL (ref 0.2–1.2)
BILIRUB UR QL STRIP: NEGATIVE
BUN SERPL-MCNC: 21 MG/DL (ref 7–25)
BUN/CREAT SERPL: 18 (CALC) (ref 6–22)
CALCIUM SERPL-MCNC: 9.2 MG/DL (ref 8.6–10.4)
CHLORIDE SERPL-SCNC: 106 MMOL/L (ref 98–110)
CHOLEST SERPL-MCNC: 202 MG/DL
CHOLEST/HDLC SERPL: 4 (CALC)
CO2 SERPL-SCNC: 27 MMOL/L (ref 20–32)
COLOR UR: YELLOW
CREAT SERPL-MCNC: 1.18 MG/DL (ref 0.6–0.88)
EOSINOPHIL # BLD AUTO: 171 CELLS/UL (ref 15–500)
EOSINOPHIL NFR BLD AUTO: 1.9 %
ERYTHROCYTE [DISTWIDTH] IN BLOOD BY AUTOMATED COUNT: 13.2 % (ref 11–15)
GFRSERPLBLD MDRD-ARVRAT: 44 ML/MIN/1.73M2
GLOBULIN SER CALC-MCNC: 2.5 G/DL (CALC) (ref 1.9–3.7)
GLUCOSE SERPL-MCNC: 122 MG/DL (ref 65–139)
GLUCOSE UR QL STRIP: NEGATIVE
HCT VFR BLD AUTO: 36.7 % (ref 35–45)
HDLC SERPL-MCNC: 50 MG/DL
HGB BLD-MCNC: 12.4 G/DL (ref 11.7–15.5)
HGB UR QL STRIP: NEGATIVE
HYALINE CASTS #/AREA URNS LPF: ABNORMAL /LPF
KETONES UR QL STRIP: ABNORMAL
LDLC SERPL CALC-MCNC: ABNORMAL MG/DL (CALC)
LEUKOCYTE ESTERASE UR QL STRIP: ABNORMAL
LYMPHOCYTES # BLD AUTO: 2628 CELLS/UL (ref 850–3900)
LYMPHOCYTES NFR BLD AUTO: 29.2 %
MCH RBC QN AUTO: 29.2 PG (ref 27–33)
MCHC RBC AUTO-ENTMCNC: 33.8 G/DL (ref 32–36)
MCV RBC AUTO: 86.6 FL (ref 80–100)
MONOCYTES # BLD AUTO: 495 CELLS/UL (ref 200–950)
MONOCYTES NFR BLD AUTO: 5.5 %
NEUTROPHILS # BLD AUTO: 5670 CELLS/UL (ref 1500–7800)
NEUTROPHILS NFR BLD AUTO: 63 %
NITRITE UR QL STRIP: NEGATIVE
NONHDLC SERPL-MCNC: 152 MG/DL (CALC)
PH UR STRIP: ABNORMAL [PH] (ref 5–8)
PLATELET # BLD AUTO: 268 THOUSAND/UL (ref 140–400)
PMV BLD REES-ECKER: 10.3 FL (ref 7.5–12.5)
POTASSIUM SERPL-SCNC: 3.9 MMOL/L (ref 3.5–5.3)
PROT SERPL-MCNC: 7 G/DL (ref 6.1–8.1)
PROT UR QL STRIP: NEGATIVE
RBC # BLD AUTO: 4.24 MILLION/UL (ref 3.8–5.1)
RBC #/AREA URNS HPF: ABNORMAL /HPF
SODIUM SERPL-SCNC: 143 MMOL/L (ref 135–146)
SP GR UR STRIP: 1.02 (ref 1–1.03)
SQUAMOUS #/AREA URNS HPF: ABNORMAL /HPF
T4 FREE SERPL-MCNC: 0.8 NG/DL (ref 0.8–1.8)
TRIGL SERPL-MCNC: 452 MG/DL
TSH SERPL-ACNC: 5.42 MIU/L (ref 0.4–4.5)
WBC # BLD AUTO: 9 THOUSAND/UL (ref 3.8–10.8)
WBC #/AREA URNS HPF: ABNORMAL /HPF

## 2021-03-31 RX ORDER — CLOPIDOGREL BISULFATE 75 MG/1
75 TABLET ORAL DAILY
Qty: 90 TABLET | Refills: 1 | Status: SHIPPED | OUTPATIENT
Start: 2021-03-31 | End: 2021-06-14 | Stop reason: SDUPTHER

## 2021-03-31 RX ORDER — SERTRALINE HYDROCHLORIDE 50 MG/1
50 TABLET, FILM COATED ORAL DAILY
Qty: 90 TABLET | Refills: 1 | Status: SHIPPED | OUTPATIENT
Start: 2021-03-31 | End: 2021-12-20 | Stop reason: SDUPTHER

## 2021-03-31 RX ORDER — PANTOPRAZOLE SODIUM 40 MG/1
40 TABLET, DELAYED RELEASE ORAL DAILY
Qty: 90 TABLET | Refills: 1 | Status: SHIPPED | OUTPATIENT
Start: 2021-03-31 | End: 2021-04-08

## 2021-03-31 RX ORDER — ROSUVASTATIN CALCIUM 20 MG/1
20 TABLET, COATED ORAL DAILY
Qty: 90 TABLET | Refills: 3 | Status: SHIPPED | OUTPATIENT
Start: 2021-03-31 | End: 2021-06-14 | Stop reason: SDUPTHER

## 2021-03-31 RX ORDER — AMLODIPINE BESYLATE 5 MG/1
5 TABLET ORAL DAILY
Qty: 90 TABLET | Refills: 1 | Status: SHIPPED | OUTPATIENT
Start: 2021-03-31 | End: 2021-06-14 | Stop reason: SDUPTHER

## 2021-03-31 RX ORDER — LEVOTHYROXINE SODIUM 50 UG/1
50 TABLET ORAL
Qty: 30 TABLET | Refills: 11 | Status: SHIPPED | OUTPATIENT
Start: 2021-03-31 | End: 2021-06-14 | Stop reason: SDUPTHER

## 2021-04-01 ENCOUNTER — TELEPHONE (OUTPATIENT)
Dept: FAMILY MEDICINE | Facility: CLINIC | Age: 81
End: 2021-04-01

## 2021-04-01 DIAGNOSIS — Z79.899 ENCOUNTER FOR LONG-TERM (CURRENT) USE OF OTHER MEDICATIONS: Primary | ICD-10-CM

## 2021-04-08 ENCOUNTER — TELEPHONE (OUTPATIENT)
Dept: FAMILY MEDICINE | Facility: CLINIC | Age: 81
End: 2021-04-08

## 2021-04-08 RX ORDER — PANTOPRAZOLE SODIUM 40 MG/1
40 TABLET, DELAYED RELEASE ORAL DAILY
COMMUNITY
End: 2021-10-08 | Stop reason: SDUPTHER

## 2021-05-24 ENCOUNTER — TELEPHONE (OUTPATIENT)
Dept: FAMILY MEDICINE | Facility: CLINIC | Age: 81
End: 2021-05-24

## 2021-05-28 LAB
ALBUMIN SERPL-MCNC: 4.2 G/DL (ref 3.6–5.1)
ALBUMIN/GLOB SERPL: 1.9 (CALC) (ref 1–2.5)
ALP SERPL-CCNC: 38 U/L (ref 37–153)
ALT SERPL-CCNC: 11 U/L (ref 6–29)
APPEARANCE UR: CLEAR
AST SERPL-CCNC: 19 U/L (ref 10–35)
BACTERIA #/AREA URNS HPF: ABNORMAL /HPF
BACTERIA UR CULT: NORMAL
BASOPHILS # BLD AUTO: 38 CELLS/UL (ref 0–200)
BASOPHILS NFR BLD AUTO: 0.6 %
BILIRUB SERPL-MCNC: 0.6 MG/DL (ref 0.2–1.2)
BILIRUB UR QL STRIP: NEGATIVE
BUN SERPL-MCNC: 21 MG/DL (ref 7–25)
BUN/CREAT SERPL: 22 (CALC) (ref 6–22)
CALCIUM SERPL-MCNC: 9 MG/DL (ref 8.6–10.4)
CHLORIDE SERPL-SCNC: 105 MMOL/L (ref 98–110)
CHOLEST SERPL-MCNC: 130 MG/DL
CHOLEST/HDLC SERPL: 2.7 (CALC)
CO2 SERPL-SCNC: 31 MMOL/L (ref 20–32)
COLOR UR: YELLOW
CREAT SERPL-MCNC: 0.97 MG/DL (ref 0.6–0.88)
EOSINOPHIL # BLD AUTO: 239 CELLS/UL (ref 15–500)
EOSINOPHIL NFR BLD AUTO: 3.8 %
ERYTHROCYTE [DISTWIDTH] IN BLOOD BY AUTOMATED COUNT: 13 % (ref 11–15)
GLOBULIN SER CALC-MCNC: 2.2 G/DL (CALC) (ref 1.9–3.7)
GLUCOSE SERPL-MCNC: 102 MG/DL (ref 65–99)
GLUCOSE UR QL STRIP: NEGATIVE
HCT VFR BLD AUTO: 34.7 % (ref 35–45)
HDLC SERPL-MCNC: 49 MG/DL
HGB BLD-MCNC: 11.4 G/DL (ref 11.7–15.5)
HGB UR QL STRIP: NEGATIVE
HYALINE CASTS #/AREA URNS LPF: ABNORMAL /LPF
KETONES UR QL STRIP: NEGATIVE
LDLC SERPL CALC-MCNC: 59 MG/DL (CALC)
LEUKOCYTE ESTERASE UR QL STRIP: ABNORMAL
LYMPHOCYTES # BLD AUTO: 1846 CELLS/UL (ref 850–3900)
LYMPHOCYTES NFR BLD AUTO: 29.3 %
MCH RBC QN AUTO: 28.9 PG (ref 27–33)
MCHC RBC AUTO-ENTMCNC: 32.9 G/DL (ref 32–36)
MCV RBC AUTO: 87.8 FL (ref 80–100)
MONOCYTES # BLD AUTO: 466 CELLS/UL (ref 200–950)
MONOCYTES NFR BLD AUTO: 7.4 %
NEUTROPHILS # BLD AUTO: 3711 CELLS/UL (ref 1500–7800)
NEUTROPHILS NFR BLD AUTO: 58.9 %
NITRITE UR QL STRIP: NEGATIVE
NONHDLC SERPL-MCNC: 81 MG/DL (CALC)
PH UR STRIP: 6 [PH] (ref 5–8)
PLATELET # BLD AUTO: 221 THOUSAND/UL (ref 140–400)
PMV BLD REES-ECKER: 10.7 FL (ref 7.5–12.5)
POTASSIUM SERPL-SCNC: 3.4 MMOL/L (ref 3.5–5.3)
PROT SERPL-MCNC: 6.4 G/DL (ref 6.1–8.1)
PROT UR QL STRIP: NEGATIVE
RBC # BLD AUTO: 3.95 MILLION/UL (ref 3.8–5.1)
RBC #/AREA URNS HPF: ABNORMAL /HPF
SODIUM SERPL-SCNC: 146 MMOL/L (ref 135–146)
SP GR UR STRIP: 1.01 (ref 1–1.03)
SQUAMOUS #/AREA URNS HPF: ABNORMAL /HPF
TRIGL SERPL-MCNC: 140 MG/DL
TSH SERPL-ACNC: 0.79 MIU/L (ref 0.4–4.5)
WBC # BLD AUTO: 6.3 THOUSAND/UL (ref 3.8–10.8)
WBC #/AREA URNS HPF: ABNORMAL /HPF

## 2021-06-10 ENCOUNTER — TELEPHONE (OUTPATIENT)
Dept: FAMILY MEDICINE | Facility: CLINIC | Age: 81
End: 2021-06-10

## 2021-06-14 ENCOUNTER — OFFICE VISIT (OUTPATIENT)
Dept: FAMILY MEDICINE | Facility: CLINIC | Age: 81
End: 2021-06-14
Payer: MEDICARE

## 2021-06-14 VITALS
WEIGHT: 138 LBS | HEART RATE: 60 BPM | SYSTOLIC BLOOD PRESSURE: 102 MMHG | DIASTOLIC BLOOD PRESSURE: 54 MMHG | BODY MASS INDEX: 26.06 KG/M2 | HEIGHT: 61 IN

## 2021-06-14 DIAGNOSIS — K21.9 GASTROESOPHAGEAL REFLUX DISEASE, UNSPECIFIED WHETHER ESOPHAGITIS PRESENT: ICD-10-CM

## 2021-06-14 DIAGNOSIS — E78.5 HYPERLIPIDEMIA, UNSPECIFIED HYPERLIPIDEMIA TYPE: ICD-10-CM

## 2021-06-14 DIAGNOSIS — F32.A DEPRESSION, UNSPECIFIED DEPRESSION TYPE: ICD-10-CM

## 2021-06-14 DIAGNOSIS — G25.81 RLS (RESTLESS LEGS SYNDROME): ICD-10-CM

## 2021-06-14 DIAGNOSIS — Z79.899 HIGH RISK MEDICATION USE: Primary | ICD-10-CM

## 2021-06-14 DIAGNOSIS — E03.9 HYPOTHYROIDISM, UNSPECIFIED TYPE: ICD-10-CM

## 2021-06-14 DIAGNOSIS — G47.00 INSOMNIA, UNSPECIFIED TYPE: ICD-10-CM

## 2021-06-14 DIAGNOSIS — F41.9 ANXIETY: ICD-10-CM

## 2021-06-14 DIAGNOSIS — I10 HYPERTENSION, UNSPECIFIED TYPE: ICD-10-CM

## 2021-06-14 PROCEDURE — 99214 PR OFFICE/OUTPT VISIT, EST, LEVL IV, 30-39 MIN: ICD-10-PCS | Mod: S$GLB,,, | Performed by: NURSE PRACTITIONER

## 2021-06-14 PROCEDURE — 99214 OFFICE O/P EST MOD 30 MIN: CPT | Mod: S$GLB,,, | Performed by: NURSE PRACTITIONER

## 2021-06-14 RX ORDER — LEVOTHYROXINE SODIUM 50 UG/1
50 TABLET ORAL
Qty: 90 TABLET | Refills: 1 | Status: SHIPPED | OUTPATIENT
Start: 2021-06-14 | End: 2022-01-11 | Stop reason: SDUPTHER

## 2021-06-14 RX ORDER — SERTRALINE HYDROCHLORIDE 50 MG/1
50 TABLET, FILM COATED ORAL DAILY
Qty: 90 TABLET | Refills: 1 | Status: CANCELLED | OUTPATIENT
Start: 2021-06-14

## 2021-06-14 RX ORDER — CLONAZEPAM 0.5 MG/1
0.5 TABLET ORAL 2 TIMES DAILY PRN
Qty: 45 TABLET | Refills: 0 | Status: SHIPPED | OUTPATIENT
Start: 2021-06-14 | End: 2021-09-21 | Stop reason: SDUPTHER

## 2021-06-14 RX ORDER — CLOPIDOGREL BISULFATE 75 MG/1
75 TABLET ORAL DAILY
Qty: 90 TABLET | Refills: 1 | Status: SHIPPED | OUTPATIENT
Start: 2021-06-14 | End: 2022-05-03 | Stop reason: SDUPTHER

## 2021-06-14 RX ORDER — ACETAMINOPHEN, DIPHENHYDRAMINE HCL, PHENYLEPHRINE HCL 325; 25; 5 MG/1; MG/1; MG/1
TABLET ORAL
COMMUNITY
End: 2022-10-03

## 2021-06-14 RX ORDER — AMLODIPINE BESYLATE 5 MG/1
5 TABLET ORAL DAILY
Qty: 90 TABLET | Refills: 1 | Status: SHIPPED | OUTPATIENT
Start: 2021-06-14 | End: 2022-05-03 | Stop reason: SDUPTHER

## 2021-06-14 RX ORDER — ROSUVASTATIN CALCIUM 20 MG/1
20 TABLET, COATED ORAL DAILY
Qty: 90 TABLET | Refills: 3 | Status: SHIPPED | OUTPATIENT
Start: 2021-06-14 | End: 2022-06-14 | Stop reason: SDUPTHER

## 2021-07-01 ENCOUNTER — PATIENT MESSAGE (OUTPATIENT)
Dept: ADMINISTRATIVE | Facility: OTHER | Age: 81
End: 2021-07-01

## 2021-09-21 ENCOUNTER — OFFICE VISIT (OUTPATIENT)
Dept: FAMILY MEDICINE | Facility: CLINIC | Age: 81
End: 2021-09-21
Payer: MEDICARE

## 2021-09-21 VITALS
SYSTOLIC BLOOD PRESSURE: 134 MMHG | DIASTOLIC BLOOD PRESSURE: 72 MMHG | WEIGHT: 136 LBS | BODY MASS INDEX: 25.68 KG/M2 | HEIGHT: 61 IN | HEART RATE: 72 BPM

## 2021-09-21 DIAGNOSIS — F32.A DEPRESSION, UNSPECIFIED DEPRESSION TYPE: ICD-10-CM

## 2021-09-21 DIAGNOSIS — J02.9 SORE THROAT: Primary | ICD-10-CM

## 2021-09-21 DIAGNOSIS — F41.9 ANXIETY: ICD-10-CM

## 2021-09-21 DIAGNOSIS — E03.9 HYPOTHYROIDISM, UNSPECIFIED TYPE: ICD-10-CM

## 2021-09-21 DIAGNOSIS — I10 HYPERTENSION, UNSPECIFIED TYPE: ICD-10-CM

## 2021-09-21 DIAGNOSIS — R53.83 FATIGUE, UNSPECIFIED TYPE: ICD-10-CM

## 2021-09-21 DIAGNOSIS — Z79.899 HIGH RISK MEDICATION USE: ICD-10-CM

## 2021-09-21 LAB
CTP QC/QA: YES
CTP QC/QA: YES
MOLECULAR STREP A: NEGATIVE
SARS-COV-2 RDRP RESP QL NAA+PROBE: NEGATIVE

## 2021-09-21 PROCEDURE — U0002: ICD-10-PCS | Mod: QW,CR,S$GLB, | Performed by: NURSE PRACTITIONER

## 2021-09-21 PROCEDURE — 87651 POCT STREP A MOLECULAR: ICD-10-PCS | Mod: QW,,, | Performed by: NURSE PRACTITIONER

## 2021-09-21 PROCEDURE — 99213 PR OFFICE/OUTPT VISIT, EST, LEVL III, 20-29 MIN: ICD-10-PCS | Mod: S$GLB,,, | Performed by: NURSE PRACTITIONER

## 2021-09-21 PROCEDURE — 87651 STREP A DNA AMP PROBE: CPT | Mod: QW,,, | Performed by: NURSE PRACTITIONER

## 2021-09-21 PROCEDURE — U0002 COVID-19 LAB TEST NON-CDC: HCPCS | Mod: QW,CR,S$GLB, | Performed by: NURSE PRACTITIONER

## 2021-09-21 PROCEDURE — 99213 OFFICE O/P EST LOW 20 MIN: CPT | Mod: S$GLB,,, | Performed by: NURSE PRACTITIONER

## 2021-09-21 RX ORDER — DOXYCYCLINE 100 MG/1
100 TABLET ORAL 2 TIMES DAILY
Qty: 20 TABLET | Refills: 0 | Status: SHIPPED | OUTPATIENT
Start: 2021-09-21 | End: 2022-09-12

## 2021-09-21 RX ORDER — CLONAZEPAM 0.5 MG/1
0.5 TABLET ORAL 2 TIMES DAILY PRN
Qty: 45 TABLET | Refills: 0 | Status: SHIPPED | OUTPATIENT
Start: 2021-09-21 | End: 2021-11-18 | Stop reason: SDUPTHER

## 2021-10-10 RX ORDER — PANTOPRAZOLE SODIUM 40 MG/1
40 TABLET, DELAYED RELEASE ORAL DAILY
Qty: 90 TABLET | Refills: 1 | Status: SHIPPED | OUTPATIENT
Start: 2021-10-10 | End: 2022-05-09 | Stop reason: SDUPTHER

## 2021-10-19 ENCOUNTER — TELEPHONE (OUTPATIENT)
Dept: FAMILY MEDICINE | Facility: CLINIC | Age: 81
End: 2021-10-19

## 2021-11-18 ENCOUNTER — TELEPHONE (OUTPATIENT)
Dept: FAMILY MEDICINE | Facility: CLINIC | Age: 81
End: 2021-11-18
Payer: MEDICARE

## 2021-11-18 ENCOUNTER — OFFICE VISIT (OUTPATIENT)
Dept: FAMILY MEDICINE | Facility: CLINIC | Age: 81
End: 2021-11-18
Payer: MEDICARE

## 2021-11-18 VITALS
SYSTOLIC BLOOD PRESSURE: 120 MMHG | DIASTOLIC BLOOD PRESSURE: 60 MMHG | HEART RATE: 66 BPM | HEIGHT: 60 IN | BODY MASS INDEX: 27.09 KG/M2 | WEIGHT: 138 LBS

## 2021-11-18 DIAGNOSIS — J02.9 PHARYNGITIS, UNSPECIFIED ETIOLOGY: Primary | ICD-10-CM

## 2021-11-18 DIAGNOSIS — I10 HYPERTENSION, UNSPECIFIED TYPE: ICD-10-CM

## 2021-11-18 DIAGNOSIS — Z79.899 HIGH RISK MEDICATION USE: ICD-10-CM

## 2021-11-18 DIAGNOSIS — F41.9 ANXIETY: ICD-10-CM

## 2021-11-18 DIAGNOSIS — G25.81 RLS (RESTLESS LEGS SYNDROME): ICD-10-CM

## 2021-11-18 DIAGNOSIS — E78.5 HYPERLIPIDEMIA, UNSPECIFIED HYPERLIPIDEMIA TYPE: ICD-10-CM

## 2021-11-18 PROCEDURE — 99214 OFFICE O/P EST MOD 30 MIN: CPT | Mod: S$GLB,,, | Performed by: NURSE PRACTITIONER

## 2021-11-18 PROCEDURE — 99214 PR OFFICE/OUTPT VISIT, EST, LEVL IV, 30-39 MIN: ICD-10-PCS | Mod: S$GLB,,, | Performed by: NURSE PRACTITIONER

## 2021-11-18 RX ORDER — AZITHROMYCIN 250 MG/1
TABLET, FILM COATED ORAL
Qty: 6 TABLET | Refills: 0 | Status: SHIPPED | OUTPATIENT
Start: 2021-11-18 | End: 2021-11-23

## 2021-11-18 RX ORDER — CLONAZEPAM 0.5 MG/1
0.5 TABLET ORAL 2 TIMES DAILY PRN
Qty: 45 TABLET | Refills: 0 | Status: SHIPPED | OUTPATIENT
Start: 2021-11-18 | End: 2022-04-19 | Stop reason: SDUPTHER

## 2021-12-20 DIAGNOSIS — F32.A DEPRESSION, UNSPECIFIED DEPRESSION TYPE: ICD-10-CM

## 2021-12-20 DIAGNOSIS — F41.9 ANXIETY: ICD-10-CM

## 2021-12-20 RX ORDER — SERTRALINE HYDROCHLORIDE 50 MG/1
50 TABLET, FILM COATED ORAL DAILY
Qty: 90 TABLET | Refills: 1 | Status: SHIPPED | OUTPATIENT
Start: 2021-12-20 | End: 2022-06-14 | Stop reason: SDUPTHER

## 2021-12-20 RX ORDER — CLOTRIMAZOLE AND BETAMETHASONE DIPROPIONATE 10; .64 MG/G; MG/G
CREAM TOPICAL 2 TIMES DAILY
Qty: 45 G | Refills: 1 | Status: SHIPPED | OUTPATIENT
Start: 2021-12-20 | End: 2022-06-14 | Stop reason: SDUPTHER

## 2022-01-11 DIAGNOSIS — E03.9 HYPOTHYROIDISM, UNSPECIFIED TYPE: ICD-10-CM

## 2022-01-11 RX ORDER — LEVOTHYROXINE SODIUM 50 UG/1
50 TABLET ORAL
Qty: 90 TABLET | Refills: 1 | Status: SHIPPED | OUTPATIENT
Start: 2022-01-11 | End: 2022-05-23 | Stop reason: SDUPTHER

## 2022-01-11 NOTE — TELEPHONE ENCOUNTER
----- Message from Coty Rodriguez sent at 1/11/2022  1:26 PM CST -----  Vm- pt needs refill on levothyroxine 50 mcg   [phone]

## 2022-04-19 DIAGNOSIS — F41.9 ANXIETY: ICD-10-CM

## 2022-04-19 RX ORDER — CLONAZEPAM 0.5 MG/1
0.5 TABLET ORAL 2 TIMES DAILY PRN
Qty: 45 TABLET | Refills: 0 | Status: SHIPPED | OUTPATIENT
Start: 2022-04-19 | End: 2022-04-20 | Stop reason: SDUPTHER

## 2022-04-19 NOTE — TELEPHONE ENCOUNTER
----- Message from Coty Rodriguez sent at 4/19/2022 12:26 PM CDT -----  Vm- pt needs refill on clonazpam .5  [facility] in [place]

## 2022-04-19 NOTE — TELEPHONE ENCOUNTER
Seen in November, needs lab. 1 mo rx set up only. LMOR for her and  to call back in separate encounter.

## 2022-04-20 DIAGNOSIS — F41.9 ANXIETY: ICD-10-CM

## 2022-04-20 RX ORDER — CLONAZEPAM 0.5 MG/1
0.5 TABLET ORAL 2 TIMES DAILY PRN
Qty: 45 TABLET | Refills: 0 | Status: SHIPPED | OUTPATIENT
Start: 2022-04-20 | End: 2022-05-23 | Stop reason: SDUPTHER

## 2022-04-20 NOTE — TELEPHONE ENCOUNTER
----- Message from Miley Gonzales sent at 4/20/2022 11:43 AM CDT -----  Patient called and stated that her medicine was called into the wrong pharmacy yesterday she stated that it was sent to [facility] in [place] and she told the nurse it needed to go to [facility] in [place] because she does not live out here anymore please give her a call at [phone]

## 2022-05-03 DIAGNOSIS — I10 HYPERTENSION, UNSPECIFIED TYPE: ICD-10-CM

## 2022-05-03 RX ORDER — CLOPIDOGREL BISULFATE 75 MG/1
75 TABLET ORAL DAILY
Qty: 90 TABLET | Refills: 1 | Status: SHIPPED | OUTPATIENT
Start: 2022-05-03 | End: 2022-06-14 | Stop reason: SDUPTHER

## 2022-05-03 RX ORDER — AMLODIPINE BESYLATE 5 MG/1
5 TABLET ORAL DAILY
Qty: 90 TABLET | Refills: 1 | Status: SHIPPED | OUTPATIENT
Start: 2022-05-03 | End: 2022-06-14 | Stop reason: SDUPTHER

## 2022-05-03 NOTE — TELEPHONE ENCOUNTER
----- Message from Coty Rodriguez sent at 5/3/2022  2:50 PM CDT -----  [facility] on amlodopine and clopidogrel

## 2022-05-09 RX ORDER — PANTOPRAZOLE SODIUM 40 MG/1
40 TABLET, DELAYED RELEASE ORAL DAILY
Qty: 90 TABLET | Refills: 3 | Status: SHIPPED | OUTPATIENT
Start: 2022-05-09 | End: 2022-12-13 | Stop reason: SDUPTHER

## 2022-05-09 NOTE — TELEPHONE ENCOUNTER
----- Message from Miley Gonzales sent at 5/9/2022 10:59 AM CDT -----  Patient called and stated that she need a refill of her pantoprazole called into [facility] in [place] if any questions please give her a call at [phone]

## 2022-05-23 DIAGNOSIS — F41.9 ANXIETY: ICD-10-CM

## 2022-05-23 DIAGNOSIS — E03.9 HYPOTHYROIDISM, UNSPECIFIED TYPE: ICD-10-CM

## 2022-05-23 RX ORDER — CLONAZEPAM 0.5 MG/1
0.5 TABLET ORAL 2 TIMES DAILY PRN
Qty: 45 TABLET | Refills: 0 | Status: SHIPPED | OUTPATIENT
Start: 2022-05-23 | End: 2022-09-12

## 2022-05-23 RX ORDER — LEVOTHYROXINE SODIUM 50 UG/1
50 TABLET ORAL
Qty: 30 TABLET | Refills: 0 | Status: SHIPPED | OUTPATIENT
Start: 2022-05-23 | End: 2022-06-14 | Stop reason: SDUPTHER

## 2022-05-23 NOTE — TELEPHONE ENCOUNTER
----- Message from Coty Rodriguez sent at 5/23/2022  1:06 PM CDT -----  Vm- pt needs refill on clonazepam, levothyroxine,   [facility]   [phone]

## 2022-06-14 ENCOUNTER — OFFICE VISIT (OUTPATIENT)
Dept: FAMILY MEDICINE | Facility: CLINIC | Age: 82
End: 2022-06-14
Payer: MEDICARE

## 2022-06-14 VITALS
HEIGHT: 60 IN | BODY MASS INDEX: 26.77 KG/M2 | HEART RATE: 65 BPM | WEIGHT: 136.38 LBS | SYSTOLIC BLOOD PRESSURE: 128 MMHG | DIASTOLIC BLOOD PRESSURE: 74 MMHG | OXYGEN SATURATION: 97 %

## 2022-06-14 DIAGNOSIS — Z00.00 PHYSICAL EXAM: ICD-10-CM

## 2022-06-14 DIAGNOSIS — N18.9 CHRONIC KIDNEY DISEASE, UNSPECIFIED CKD STAGE: ICD-10-CM

## 2022-06-14 DIAGNOSIS — I10 HYPERTENSION, UNSPECIFIED TYPE: ICD-10-CM

## 2022-06-14 DIAGNOSIS — K21.9 GASTROESOPHAGEAL REFLUX DISEASE, UNSPECIFIED WHETHER ESOPHAGITIS PRESENT: ICD-10-CM

## 2022-06-14 DIAGNOSIS — G47.00 INSOMNIA, UNSPECIFIED TYPE: ICD-10-CM

## 2022-06-14 DIAGNOSIS — E78.5 HYPERLIPIDEMIA, UNSPECIFIED HYPERLIPIDEMIA TYPE: ICD-10-CM

## 2022-06-14 DIAGNOSIS — F41.9 ANXIETY: ICD-10-CM

## 2022-06-14 DIAGNOSIS — E03.9 HYPOTHYROIDISM, UNSPECIFIED TYPE: ICD-10-CM

## 2022-06-14 DIAGNOSIS — Z79.899 HIGH RISK MEDICATION USE: Primary | ICD-10-CM

## 2022-06-14 DIAGNOSIS — F32.A DEPRESSION, UNSPECIFIED DEPRESSION TYPE: ICD-10-CM

## 2022-06-14 PROCEDURE — 99214 PR OFFICE/OUTPT VISIT, EST, LEVL IV, 30-39 MIN: ICD-10-PCS | Mod: S$GLB,,, | Performed by: NURSE PRACTITIONER

## 2022-06-14 PROCEDURE — 99214 OFFICE O/P EST MOD 30 MIN: CPT | Mod: S$GLB,,, | Performed by: NURSE PRACTITIONER

## 2022-06-14 RX ORDER — CLOTRIMAZOLE AND BETAMETHASONE DIPROPIONATE 10; .64 MG/G; MG/G
CREAM TOPICAL 2 TIMES DAILY
Qty: 45 G | Refills: 1 | Status: SHIPPED | OUTPATIENT
Start: 2022-06-14

## 2022-06-14 RX ORDER — LEVOTHYROXINE SODIUM 50 UG/1
50 TABLET ORAL
Qty: 90 TABLET | Refills: 1 | Status: SHIPPED | OUTPATIENT
Start: 2022-06-14 | End: 2022-12-13 | Stop reason: SDUPTHER

## 2022-06-14 RX ORDER — CLOPIDOGREL BISULFATE 75 MG/1
75 TABLET ORAL DAILY
Qty: 90 TABLET | Refills: 1 | Status: SHIPPED | OUTPATIENT
Start: 2022-06-14 | End: 2022-12-13 | Stop reason: SDUPTHER

## 2022-06-14 RX ORDER — SERTRALINE HYDROCHLORIDE 50 MG/1
50 TABLET, FILM COATED ORAL DAILY
Qty: 90 TABLET | Refills: 1 | Status: SHIPPED | OUTPATIENT
Start: 2022-06-14 | End: 2022-12-13 | Stop reason: SDUPTHER

## 2022-06-14 RX ORDER — AMLODIPINE BESYLATE 5 MG/1
5 TABLET ORAL DAILY
Qty: 90 TABLET | Refills: 1 | Status: SHIPPED | OUTPATIENT
Start: 2022-06-14 | End: 2022-12-13

## 2022-06-14 RX ORDER — ROSUVASTATIN CALCIUM 20 MG/1
20 TABLET, COATED ORAL DAILY
Qty: 90 TABLET | Refills: 3 | Status: SHIPPED | OUTPATIENT
Start: 2022-06-14 | End: 2022-12-13 | Stop reason: SDUPTHER

## 2022-06-14 NOTE — PROGRESS NOTES
SUBJECTIVE:    Patient ID: Hailey Mariano is a 82 y.o. female.    Chief Complaint: Follow-up (Bottles brought//Pt is here for a check up and medications refills//Decline Dexa and Pna vaccine today.//JULIANO)    82-year-old female presents for check up.  Treated for htn hyperlipidemia gerd and anxiety. Due for labs. Taking medication as prescribed. bp in office today is well controlled. Does not check at home. Recently moved back to [place]. Sleeping well. Feels less stressed since moving. Lives with . Appetite is good. Sleeping better most nights.         Office Visit on 06/14/2022   Component Date Value Ref Range Status    WBC 06/14/2022 7.5  3.8 - 10.8 Thousand/uL Final    RBC 06/14/2022 4.08  3.80 - 5.10 Million/uL Final    Hemoglobin 06/14/2022 11.5 (A) 11.7 - 15.5 g/dL Final    Hematocrit 06/14/2022 35.2  35.0 - 45.0 % Final    MCV 06/14/2022 86.3  80.0 - 100.0 fL Final    MCH 06/14/2022 28.2  27.0 - 33.0 pg Final    MCHC 06/14/2022 32.7  32.0 - 36.0 g/dL Final    RDW 06/14/2022 12.7  11.0 - 15.0 % Final    Platelets 06/14/2022 220  140 - 400 Thousand/uL Final    MPV 06/14/2022 10.6  7.5 - 12.5 fL Final    Neutrophils, Abs 06/14/2022 4,433  1,500 - 7,800 cells/uL Final    Lymph # 06/14/2022 2,280  850 - 3,900 cells/uL Final    Mono # 06/14/2022 465  200 - 950 cells/uL Final    Eos # 06/14/2022 285  15 - 500 cells/uL Final    Baso # 06/14/2022 38  0 - 200 cells/uL Final    Neutrophils Relative 06/14/2022 59.1  % Final    Lymph % 06/14/2022 30.4  % Final    Mono % 06/14/2022 6.2  % Final    Eosinophil % 06/14/2022 3.8  % Final    Basophil % 06/14/2022 0.5  % Final    Glucose 06/14/2022 94  65 - 139 mg/dL Final    BUN 06/14/2022 17  7 - 25 mg/dL Final    Creatinine 06/14/2022 1.08 (A) 0.60 - 0.88 mg/dL Final    eGFR if non  06/14/2022 48 (A) > OR = 60 mL/min/1.73m2 Final    eGFR if  06/14/2022 55 (A) > OR = 60 mL/min/1.73m2 Final    BUN/Creatinine  Ratio 06/14/2022 16  6 - 22 (calc) Final    Sodium 06/14/2022 143  135 - 146 mmol/L Final    Potassium 06/14/2022 3.6  3.5 - 5.3 mmol/L Final    Chloride 06/14/2022 107  98 - 110 mmol/L Final    CO2 06/14/2022 29  20 - 32 mmol/L Final    Calcium 06/14/2022 9.2  8.6 - 10.4 mg/dL Final    Total Protein 06/14/2022 6.5  6.1 - 8.1 g/dL Final    Albumin 06/14/2022 4.1  3.6 - 5.1 g/dL Final    Globulin, Total 06/14/2022 2.4  1.9 - 3.7 g/dL (calc) Final    Albumin/Globulin Ratio 06/14/2022 1.7  1.0 - 2.5 (calc) Final    Total Bilirubin 06/14/2022 0.4  0.2 - 1.2 mg/dL Final    Alkaline Phosphatase 06/14/2022 40  37 - 153 U/L Final    AST 06/14/2022 20  10 - 35 U/L Final    ALT 06/14/2022 13  6 - 29 U/L Final    Cholesterol 06/14/2022 163  <200 mg/dL Final    HDL 06/14/2022 48 (A) > OR = 50 mg/dL Final    Triglycerides 06/14/2022 273 (A) <150 mg/dL Final    LDL Cholesterol 06/14/2022 79  mg/dL (calc) Final    HDL/Cholesterol Ratio 06/14/2022 3.4  <5.0 (calc) Final    Non HDL Chol. (LDL+VLDL) 06/14/2022 115  <130 mg/dL (calc) Final    TSH w/reflex to FT4 06/14/2022 4.30  0.40 - 4.50 mIU/L Final    Creatinine, Urine 06/14/2022 204  20 - 275 mg/dL Final    Microalb, Ur 06/14/2022 1.5  See Note: mg/dL Final    Microalb/Creat Ratio 06/14/2022 7  <30 mcg/mg creat Final    Color, UA 06/14/2022 DARK YELLOW  YELLOW Final    Appearance, UA 06/14/2022 CLEAR  CLEAR Final    Specific Gravity, UA 06/14/2022 1.022  1.001 - 1.035 Final    pH, UA 06/14/2022 < OR = 5.0  5.0 - 8.0 Final    Glucose, UA 06/14/2022 NEGATIVE  NEGATIVE Final    Bilirubin, UA 06/14/2022 NEGATIVE  NEGATIVE Final    Ketones, UA 06/14/2022 TRACE (A) NEGATIVE Final    Occult Blood UA 06/14/2022 NEGATIVE  NEGATIVE Final    Protein, UA 06/14/2022 NEGATIVE  NEGATIVE Final    Nitrite, UA 06/14/2022 NEGATIVE  NEGATIVE Final    Leukocytes, UA 06/14/2022 1+ (A) NEGATIVE Final    WBC Casts, UA 06/14/2022 0-5  < OR = 5 /HPF Final    RBC Casts,  UA 06/14/2022 0-2  < OR = 2 /HPF Final    Squam Epithel, UA 06/14/2022 0-5  < OR = 5 /HPF Final    Bacteria, UA 06/14/2022 NONE SEEN  NONE SEEN /HPF Final    Hyaline Casts, UA 06/14/2022 0-5 (A) NONE SEEN /LPF Final    Reflexive Urine Culture 06/14/2022    Final    Urine Culture, Routine 06/14/2022    Final       Past Medical History:   Diagnosis Date    Anxiety     Depression     Diverticulitis     GERD (gastroesophageal reflux disease)     Hyperlipidemia     Hypertension     Ovarian cancer 2002    TIA (transient ischemic attack)      Social History     Socioeconomic History    Marital status:    Tobacco Use    Smoking status: Never Smoker    Smokeless tobacco: Never Used   Substance and Sexual Activity    Alcohol use: Not Currently     Alcohol/week: 1.0 standard drink     Types: 1 Glasses of wine per week     Past Surgical History:   Procedure Laterality Date    HERNIA REPAIR      HYSTERECTOMY       History reviewed. No pertinent family history.    Review of patient's allergies indicates:   Allergen Reactions    Flagyl [metronidazole hcl]     Morphine     Pcn [penicillins]     Sulfa (sulfonamide antibiotics)        Current Outpatient Medications:     aspirin (ECOTRIN) 81 MG EC tablet, Take 81 mg by mouth once daily., Disp: , Rfl:     pantoprazole (PROTONIX) 40 MG tablet, Take 1 tablet (40 mg total) by mouth once daily., Disp: 90 tablet, Rfl: 3    amLODIPine (NORVASC) 5 MG tablet, Take 1 tablet (5 mg total) by mouth once daily., Disp: 90 tablet, Rfl: 1    clonazePAM (KLONOPIN) 0.5 MG tablet, Take 1 tablet (0.5 mg total) by mouth 2 (two) times daily as needed for Anxiety. (Patient not taking: Reported on 6/14/2022), Disp: 45 tablet, Rfl: 0    clopidogreL (PLAVIX) 75 mg tablet, Take 1 tablet (75 mg total) by mouth once daily., Disp: 90 tablet, Rfl: 1    clotrimazole-betamethasone 1-0.05% (LOTRISONE) cream, Apply topically 2 (two) times daily., Disp: 45 g, Rfl: 1    doxycycline  monohydrate 100 mg Tab, Take 1 tablet (100 mg total) by mouth 2 (two) times a day. (Patient not taking: Reported on 11/18/2021), Disp: 20 tablet, Rfl: 0    levothyroxine (SYNTHROID) 50 MCG tablet, Take 1 tablet (50 mcg total) by mouth before breakfast., Disp: 90 tablet, Rfl: 1    melatonin 10 mg Tab, Take by mouth., Disp: , Rfl:     rosuvastatin (CRESTOR) 20 MG tablet, Take 1 tablet (20 mg total) by mouth once daily., Disp: 90 tablet, Rfl: 3    sertraline (ZOLOFT) 50 MG tablet, Take 1 tablet (50 mg total) by mouth once daily., Disp: 90 tablet, Rfl: 1    Review of Systems   Constitutional: Negative for chills, fever and unexpected weight change.   HENT: Negative for ear pain, rhinorrhea and sore throat.    Eyes: Negative for pain and visual disturbance.   Respiratory: Negative for cough and shortness of breath.    Cardiovascular: Negative for chest pain, palpitations and leg swelling.   Gastrointestinal: Negative for abdominal pain, diarrhea, nausea and vomiting.   Genitourinary: Negative for difficulty urinating, hematuria and vaginal bleeding.   Musculoskeletal: Negative for arthralgias.   Skin: Negative for rash.   Neurological: Negative for dizziness, weakness and headaches.   Psychiatric/Behavioral: Negative for agitation and sleep disturbance. The patient is not nervous/anxious.           Objective:      Vitals:    06/14/22 0934   BP: 128/74   Pulse: 65   SpO2: 97%   Weight: 61.9 kg (136 lb 6.4 oz)   Height: 5' (1.524 m)     Physical Exam  Constitutional:       General: She is not in acute distress.     Appearance: She is well-developed. She is not ill-appearing.   HENT:      Head: Normocephalic.      Right Ear: External ear normal.      Left Ear: External ear normal.   Eyes:      Conjunctiva/sclera: Conjunctivae normal.      Pupils: Pupils are equal, round, and reactive to light.   Neck:      Vascular: No JVD.   Cardiovascular:      Rate and Rhythm: Normal rate and regular rhythm.      Heart sounds: No  murmur heard.  Pulmonary:      Effort: Pulmonary effort is normal.      Breath sounds: Normal breath sounds.   Abdominal:      General: Bowel sounds are normal.      Palpations: Abdomen is soft.   Musculoskeletal:         General: No deformity. Normal range of motion.      Cervical back: Normal range of motion and neck supple.   Lymphadenopathy:      Cervical: No cervical adenopathy.   Skin:     General: Skin is warm and dry.      Findings: No rash.   Neurological:      Mental Status: She is alert and oriented to person, place, and time.      Gait: Gait normal.   Psychiatric:         Speech: Speech normal.         Behavior: Behavior normal.           Assessment:       1. High risk medication use    2. Hyperlipidemia, unspecified hyperlipidemia type    3. Anxiety    4. Depression, unspecified depression type    5. Hypertension, unspecified type    6. Hypothyroidism, unspecified type    7. Physical exam    8. Gastroesophageal reflux disease, unspecified whether esophagitis present    9. Insomnia, unspecified type    10. Chronic kidney disease, unspecified CKD stage         Plan:       High risk medication use  -     CBC Auto Differential; Future; Expected date: 06/14/2022  -     Comprehensive Metabolic Panel; Future; Expected date: 06/14/2022  -     Lipid Panel; Future; Expected date: 06/14/2022  -     TSH w/reflex to FT4; Future; Expected date: 06/14/2022  -     Microalbumin/Creatinine Ratio, Urine; Future; Expected date: 06/14/2022  -     Urinalysis, Reflex to Urine Culture Urine, Clean Catch; Future; Expected date: 06/14/2022    Hyperlipidemia, unspecified hyperlipidemia type  -     rosuvastatin (CRESTOR) 20 MG tablet; Take 1 tablet (20 mg total) by mouth once daily.  Dispense: 90 tablet; Refill: 3  -     Lipid Panel; Future; Expected date: 06/14/2022  -     Microalbumin/Creatinine Ratio, Urine; Future; Expected date: 06/14/2022  -     Urinalysis, Reflex to Urine Culture Urine, Clean Catch; Future; Expected date:  06/14/2022    Anxiety  -     sertraline (ZOLOFT) 50 MG tablet; Take 1 tablet (50 mg total) by mouth once daily.  Dispense: 90 tablet; Refill: 1    Depression, unspecified depression type  -     sertraline (ZOLOFT) 50 MG tablet; Take 1 tablet (50 mg total) by mouth once daily.  Dispense: 90 tablet; Refill: 1    Hypertension, unspecified type  Comments:  bp is well controlled today. .   Orders:  -     amLODIPine (NORVASC) 5 MG tablet; Take 1 tablet (5 mg total) by mouth once daily.  Dispense: 90 tablet; Refill: 1  -     CBC Auto Differential; Future; Expected date: 06/14/2022  -     Comprehensive Metabolic Panel; Future; Expected date: 06/14/2022  -     Lipid Panel; Future; Expected date: 06/14/2022  -     Microalbumin/Creatinine Ratio, Urine; Future; Expected date: 06/14/2022  -     Urinalysis, Reflex to Urine Culture Urine, Clean Catch; Future; Expected date: 06/14/2022    Hypothyroidism, unspecified type  -     levothyroxine (SYNTHROID) 50 MCG tablet; Take 1 tablet (50 mcg total) by mouth before breakfast.  Dispense: 90 tablet; Refill: 1  -     TSH w/reflex to FT4; Future; Expected date: 06/14/2022    Physical exam    Gastroesophageal reflux disease, unspecified whether esophagitis present    Insomnia, unspecified type    Chronic kidney disease, unspecified CKD stage  -     Comprehensive Metabolic Panel; Future; Expected date: 06/20/2022    Other orders  -     clopidogreL (PLAVIX) 75 mg tablet; Take 1 tablet (75 mg total) by mouth once daily.  Dispense: 90 tablet; Refill: 1  -     clotrimazole-betamethasone 1-0.05% (LOTRISONE) cream; Apply topically 2 (two) times daily.  Dispense: 45 g; Refill: 1      Follow up in about 6 months (around 12/14/2022), or if symptoms worsen or fail to improve, for medication management.        6/20/2022 Linnette Ghotra

## 2022-06-16 LAB
ALBUMIN SERPL-MCNC: 4.1 G/DL (ref 3.6–5.1)
ALBUMIN/CREAT UR: 7 MCG/MG CREAT
ALBUMIN/GLOB SERPL: 1.7 (CALC) (ref 1–2.5)
ALP SERPL-CCNC: 40 U/L (ref 37–153)
ALT SERPL-CCNC: 13 U/L (ref 6–29)
APPEARANCE UR: CLEAR
AST SERPL-CCNC: 20 U/L (ref 10–35)
BACTERIA #/AREA URNS HPF: ABNORMAL /HPF
BACTERIA UR CULT: ABNORMAL
BACTERIA UR CULT: ABNORMAL
BASOPHILS # BLD AUTO: 38 CELLS/UL (ref 0–200)
BASOPHILS NFR BLD AUTO: 0.5 %
BILIRUB SERPL-MCNC: 0.4 MG/DL (ref 0.2–1.2)
BILIRUB UR QL STRIP: NEGATIVE
BUN SERPL-MCNC: 17 MG/DL (ref 7–25)
BUN/CREAT SERPL: 16 (CALC) (ref 6–22)
CALCIUM SERPL-MCNC: 9.2 MG/DL (ref 8.6–10.4)
CHLORIDE SERPL-SCNC: 107 MMOL/L (ref 98–110)
CHOLEST SERPL-MCNC: 163 MG/DL
CHOLEST/HDLC SERPL: 3.4 (CALC)
CO2 SERPL-SCNC: 29 MMOL/L (ref 20–32)
COLOR UR: ABNORMAL
CREAT SERPL-MCNC: 1.08 MG/DL (ref 0.6–0.88)
CREAT UR-MCNC: 204 MG/DL (ref 20–275)
EOSINOPHIL # BLD AUTO: 285 CELLS/UL (ref 15–500)
EOSINOPHIL NFR BLD AUTO: 3.8 %
ERYTHROCYTE [DISTWIDTH] IN BLOOD BY AUTOMATED COUNT: 12.7 % (ref 11–15)
GLOBULIN SER CALC-MCNC: 2.4 G/DL (CALC) (ref 1.9–3.7)
GLUCOSE SERPL-MCNC: 94 MG/DL (ref 65–139)
GLUCOSE UR QL STRIP: NEGATIVE
HCT VFR BLD AUTO: 35.2 % (ref 35–45)
HDLC SERPL-MCNC: 48 MG/DL
HGB BLD-MCNC: 11.5 G/DL (ref 11.7–15.5)
HGB UR QL STRIP: NEGATIVE
HYALINE CASTS #/AREA URNS LPF: ABNORMAL /LPF
KETONES UR QL STRIP: ABNORMAL
LDLC SERPL CALC-MCNC: 79 MG/DL (CALC)
LEUKOCYTE ESTERASE UR QL STRIP: ABNORMAL
LYMPHOCYTES # BLD AUTO: 2280 CELLS/UL (ref 850–3900)
LYMPHOCYTES NFR BLD AUTO: 30.4 %
MCH RBC QN AUTO: 28.2 PG (ref 27–33)
MCHC RBC AUTO-ENTMCNC: 32.7 G/DL (ref 32–36)
MCV RBC AUTO: 86.3 FL (ref 80–100)
MICROALBUMIN UR-MCNC: 1.5 MG/DL
MONOCYTES # BLD AUTO: 465 CELLS/UL (ref 200–950)
MONOCYTES NFR BLD AUTO: 6.2 %
NEUTROPHILS # BLD AUTO: 4433 CELLS/UL (ref 1500–7800)
NEUTROPHILS NFR BLD AUTO: 59.1 %
NITRITE UR QL STRIP: NEGATIVE
NONHDLC SERPL-MCNC: 115 MG/DL (CALC)
PH UR STRIP: ABNORMAL [PH] (ref 5–8)
PLATELET # BLD AUTO: 220 THOUSAND/UL (ref 140–400)
PMV BLD REES-ECKER: 10.6 FL (ref 7.5–12.5)
POTASSIUM SERPL-SCNC: 3.6 MMOL/L (ref 3.5–5.3)
PROT SERPL-MCNC: 6.5 G/DL (ref 6.1–8.1)
PROT UR QL STRIP: NEGATIVE
RBC # BLD AUTO: 4.08 MILLION/UL (ref 3.8–5.1)
RBC #/AREA URNS HPF: ABNORMAL /HPF
SODIUM SERPL-SCNC: 143 MMOL/L (ref 135–146)
SP GR UR STRIP: 1.02 (ref 1–1.03)
SQUAMOUS #/AREA URNS HPF: ABNORMAL /HPF
TRIGL SERPL-MCNC: 273 MG/DL
TSH SERPL-ACNC: 4.3 MIU/L (ref 0.4–4.5)
WBC # BLD AUTO: 7.5 THOUSAND/UL (ref 3.8–10.8)
WBC #/AREA URNS HPF: ABNORMAL /HPF

## 2022-06-21 ENCOUNTER — TELEPHONE (OUTPATIENT)
Dept: FAMILY MEDICINE | Facility: CLINIC | Age: 82
End: 2022-06-21

## 2022-06-21 NOTE — TELEPHONE ENCOUNTER
----- Message from Linnette Ghotra NP sent at 6/20/2022 10:45 PM CDT -----  Kidney function is slightly decreased. Make sure staying hydrated. Avoid nsaids. Repeat in 1 month. Cholesterol and thyroid are wnl. Continue as is

## 2022-06-23 NOTE — TELEPHONE ENCOUNTER
Spoke to patient regarding lab result per Linnette and she verbalized understanding. Remind me created.

## 2022-07-19 ENCOUNTER — TELEPHONE (OUTPATIENT)
Dept: FAMILY MEDICINE | Facility: CLINIC | Age: 82
End: 2022-07-19

## 2022-07-19 NOTE — TELEPHONE ENCOUNTER
----- Message from Mame Okeefe MA sent at 6/23/2022 11:26 AM CDT -----  Regarding: Lab Due  ----- Message from Linnette Ghotra NP sent at 6/20/2022 10:45 PM CDT -----  Kidney function is slightly decreased. Make sure staying hydrated. Avoid nsaids. Repeat in 1 month. Cholesterol and thyroid are wnl. Continue as is

## 2022-07-20 RX ORDER — TRIAMCINOLONE ACETONIDE 1 MG/G
CREAM TOPICAL 2 TIMES DAILY
Qty: 45 G | Refills: 5 | Status: SHIPPED | OUTPATIENT
Start: 2022-07-20 | End: 2023-07-27 | Stop reason: SDUPTHER

## 2022-07-20 NOTE — TELEPHONE ENCOUNTER
----- Message from Coty Rodriguez sent at 7/20/2022 10:34 AM CDT -----  Pt needs refil on triamecalone cream  [facility] adelina   [phone]

## 2022-07-21 LAB
ALBUMIN SERPL-MCNC: 4.1 G/DL (ref 3.6–5.1)
ALBUMIN/GLOB SERPL: 1.9 (CALC) (ref 1–2.5)
ALP SERPL-CCNC: 37 U/L (ref 37–153)
ALT SERPL-CCNC: 13 U/L (ref 6–29)
AST SERPL-CCNC: 18 U/L (ref 10–35)
BILIRUB SERPL-MCNC: 0.5 MG/DL (ref 0.2–1.2)
BUN SERPL-MCNC: 21 MG/DL (ref 7–25)
BUN/CREAT SERPL: 18 (CALC) (ref 6–22)
CALCIUM SERPL-MCNC: 8.7 MG/DL (ref 8.6–10.4)
CHLORIDE SERPL-SCNC: 106 MMOL/L (ref 98–110)
CO2 SERPL-SCNC: 31 MMOL/L (ref 20–32)
CREAT SERPL-MCNC: 1.15 MG/DL (ref 0.6–0.95)
EGFR: 48 ML/MIN/1.73M2
GLOBULIN SER CALC-MCNC: 2.2 G/DL (CALC) (ref 1.9–3.7)
GLUCOSE SERPL-MCNC: 99 MG/DL (ref 65–99)
POTASSIUM SERPL-SCNC: 3.8 MMOL/L (ref 3.5–5.3)
PROT SERPL-MCNC: 6.3 G/DL (ref 6.1–8.1)
SODIUM SERPL-SCNC: 143 MMOL/L (ref 135–146)

## 2022-07-29 ENCOUNTER — TELEPHONE (OUTPATIENT)
Dept: FAMILY MEDICINE | Facility: CLINIC | Age: 82
End: 2022-07-29
Payer: MEDICARE

## 2022-07-29 NOTE — TELEPHONE ENCOUNTER
Called and spoke to pt about setting up AWV  Appt set up for 10/3/2022 @9:30am w/Ms [place] at [facility]

## 2022-08-30 ENCOUNTER — TELEPHONE (OUTPATIENT)
Dept: FAMILY MEDICINE | Facility: CLINIC | Age: 82
End: 2022-08-30

## 2022-08-30 NOTE — TELEPHONE ENCOUNTER
----- Message from Miley Gonzales sent at 8/30/2022 11:05 AM CDT -----  Patient called for the second time about a  release she sent over for some dental work that need to be done she stated that this was to be done about a week ago and she has not gotten a call or the dentist office has not received anything please give her a call back at [phone] and advise of the status

## 2022-08-30 NOTE — TELEPHONE ENCOUNTER
Spoke with pt who states she did not call here last week, just states she was told [facility] was going to contact us regarding a clearance. I let her know I spoke with Landon at [facility] this morning and they are going to be faxing over the clearance and we will give her a call to let her know if we need anything additionally from here or if we can go ahead and clear her.

## 2022-08-30 NOTE — TELEPHONE ENCOUNTER
----- Message from Miley Gonzales sent at 8/30/2022  9:04 AM CDT -----  Landon Ellison called and would like to know if the patient medical release has been received please give her a call at [phone] option #2

## 2022-09-01 ENCOUNTER — TELEPHONE (OUTPATIENT)
Dept: FAMILY MEDICINE | Facility: CLINIC | Age: 82
End: 2022-09-01

## 2022-09-01 NOTE — TELEPHONE ENCOUNTER
----- Message from Coty Rodriguez sent at 9/1/2022  9:02 AM CDT -----  [facility] l[facility] is calling to check on the status of her surgery clearance   Landon [phone] option 2

## 2022-09-01 NOTE — TELEPHONE ENCOUNTER
Spoke with Landon and let her know we have still not received anything from them. I gave them the [phone] fax number

## 2022-09-12 ENCOUNTER — OFFICE VISIT (OUTPATIENT)
Dept: FAMILY MEDICINE | Facility: CLINIC | Age: 82
End: 2022-09-12
Payer: MEDICARE

## 2022-09-12 VITALS
HEART RATE: 76 BPM | BODY MASS INDEX: 26.7 KG/M2 | WEIGHT: 136 LBS | OXYGEN SATURATION: 97 % | SYSTOLIC BLOOD PRESSURE: 128 MMHG | DIASTOLIC BLOOD PRESSURE: 60 MMHG | HEIGHT: 60 IN | TEMPERATURE: 98 F

## 2022-09-12 DIAGNOSIS — R05.9 COUGH: Primary | ICD-10-CM

## 2022-09-12 DIAGNOSIS — E78.5 HYPERLIPIDEMIA, UNSPECIFIED HYPERLIPIDEMIA TYPE: ICD-10-CM

## 2022-09-12 DIAGNOSIS — Z79.899 HIGH RISK MEDICATION USE: ICD-10-CM

## 2022-09-12 DIAGNOSIS — E03.9 HYPOTHYROIDISM, UNSPECIFIED TYPE: ICD-10-CM

## 2022-09-12 DIAGNOSIS — J02.9 SORE THROAT: ICD-10-CM

## 2022-09-12 DIAGNOSIS — K21.9 GASTROESOPHAGEAL REFLUX DISEASE, UNSPECIFIED WHETHER ESOPHAGITIS PRESENT: ICD-10-CM

## 2022-09-12 DIAGNOSIS — J02.9 PHARYNGITIS, UNSPECIFIED ETIOLOGY: ICD-10-CM

## 2022-09-12 DIAGNOSIS — I10 HYPERTENSION, UNSPECIFIED TYPE: ICD-10-CM

## 2022-09-12 LAB
CTP QC/QA: YES
CTP QC/QA: YES
POC MOLECULAR INFLUENZA A AGN: NEGATIVE
POC MOLECULAR INFLUENZA B AGN: NEGATIVE
SARS-COV-2 RDRP RESP QL NAA+PROBE: NEGATIVE

## 2022-09-12 PROCEDURE — 99213 OFFICE O/P EST LOW 20 MIN: CPT | Mod: S$GLB,,, | Performed by: NURSE PRACTITIONER

## 2022-09-12 PROCEDURE — 87502 INFLUENZA DNA AMP PROBE: CPT | Mod: QW,,, | Performed by: NURSE PRACTITIONER

## 2022-09-12 PROCEDURE — 87502 POCT INFLUENZA A/B MOLECULAR: ICD-10-PCS | Mod: QW,,, | Performed by: NURSE PRACTITIONER

## 2022-09-12 PROCEDURE — U0002: ICD-10-PCS | Mod: QW,CR,S$GLB, | Performed by: NURSE PRACTITIONER

## 2022-09-12 PROCEDURE — U0002 COVID-19 LAB TEST NON-CDC: HCPCS | Mod: QW,CR,S$GLB, | Performed by: NURSE PRACTITIONER

## 2022-09-12 PROCEDURE — 99213 PR OFFICE/OUTPT VISIT, EST, LEVL III, 20-29 MIN: ICD-10-PCS | Mod: S$GLB,,, | Performed by: NURSE PRACTITIONER

## 2022-09-12 RX ORDER — BENZONATATE 200 MG/1
200 CAPSULE ORAL 3 TIMES DAILY PRN
Qty: 30 CAPSULE | Refills: 0 | Status: SHIPPED | OUTPATIENT
Start: 2022-09-12 | End: 2022-09-22

## 2022-09-12 RX ORDER — AZITHROMYCIN 250 MG/1
TABLET, FILM COATED ORAL
Qty: 6 TABLET | Refills: 0 | Status: SHIPPED | OUTPATIENT
Start: 2022-09-12 | End: 2022-09-17

## 2022-09-15 ENCOUNTER — TELEPHONE (OUTPATIENT)
Dept: FAMILY MEDICINE | Facility: CLINIC | Age: 82
End: 2022-09-15

## 2022-09-15 ENCOUNTER — CLINICAL SUPPORT (OUTPATIENT)
Dept: FAMILY MEDICINE | Facility: CLINIC | Age: 82
End: 2022-09-15
Payer: MEDICARE

## 2022-09-15 DIAGNOSIS — J02.9 SORE THROAT: ICD-10-CM

## 2022-09-15 DIAGNOSIS — R05.9 COUGH: Primary | ICD-10-CM

## 2022-09-15 PROCEDURE — 96372 THER/PROPH/DIAG INJ SC/IM: CPT | Mod: S$GLB,,, | Performed by: FAMILY MEDICINE

## 2022-09-15 PROCEDURE — 96372 PR INJECTION,THERAP/PROPH/DIAG2ST, IM OR SUBCUT: ICD-10-PCS | Mod: S$GLB,,, | Performed by: FAMILY MEDICINE

## 2022-09-15 RX ORDER — DEXAMETHASONE SODIUM PHOSPHATE 4 MG/ML
8 INJECTION, SOLUTION INTRA-ARTICULAR; INTRALESIONAL; INTRAMUSCULAR; INTRAVENOUS; SOFT TISSUE ONCE
Status: COMPLETED | OUTPATIENT
Start: 2022-09-15 | End: 2022-09-15

## 2022-09-15 RX ADMIN — DEXAMETHASONE SODIUM PHOSPHATE 8 MG: 4 INJECTION, SOLUTION INTRA-ARTICULAR; INTRALESIONAL; INTRAMUSCULAR; INTRAVENOUS; SOFT TISSUE at 03:09

## 2022-09-15 NOTE — TELEPHONE ENCOUNTER
----- Message from Norma Lang MA sent at 9/15/2022 11:15 AM CDT -----  Regarding: needs appt asap  Patient chest and sinus congestion is worsening since last visit of Monday. Would like steriod inj.  [phone]

## 2022-09-21 NOTE — PROGRESS NOTES
SUBJECTIVE:    Patient ID: Hailey Mariano is a 82 y.o. female.    Chief Complaint: Sore Throat (Headache, cough not sure of sputum color, right ear pain, no appetite, since Friday has gotten, has taken OTC tylenol, lozenges, and dayquil/ nightquil, no bottles// SW)    82-year-old female presents for sick visit.   Treated for htn hyperlipidemia gerd and anxiety. Reports have not been feeling well. Started with sore throat about 4 days ago. No fever. Sore throat is constant. Seems worse at night. No taking anything. Does have headache as well. No sick contacts.        Office Visit on 09/12/2022   Component Date Value Ref Range Status    POC Rapid COVID 09/12/2022 Negative  Negative Final     Acceptable 09/12/2022 Yes   Final    POC Molecular Influenza A Ag 09/12/2022 Negative  Negative, Not Reported Final    POC Molecular Influenza B Ag 09/12/2022 Negative  Negative, Not Reported Final     Acceptable 09/12/2022 Yes   Final   Office Visit on 06/14/2022   Component Date Value Ref Range Status    WBC 06/14/2022 7.5  3.8 - 10.8 Thousand/uL Final    RBC 06/14/2022 4.08  3.80 - 5.10 Million/uL Final    Hemoglobin 06/14/2022 11.5 (L)  11.7 - 15.5 g/dL Final    Hematocrit 06/14/2022 35.2  35.0 - 45.0 % Final    MCV 06/14/2022 86.3  80.0 - 100.0 fL Final    MCH 06/14/2022 28.2  27.0 - 33.0 pg Final    MCHC 06/14/2022 32.7  32.0 - 36.0 g/dL Final    RDW 06/14/2022 12.7  11.0 - 15.0 % Final    Platelets 06/14/2022 220  140 - 400 Thousand/uL Final    MPV 06/14/2022 10.6  7.5 - 12.5 fL Final    Neutrophils, Abs 06/14/2022 4,433  1,500 - 7,800 cells/uL Final    Lymph # 06/14/2022 2,280  850 - 3,900 cells/uL Final    Mono # 06/14/2022 465  200 - 950 cells/uL Final    Eos # 06/14/2022 285  15 - 500 cells/uL Final    Baso # 06/14/2022 38  0 - 200 cells/uL Final    Neutrophils Relative 06/14/2022 59.1  % Final    Lymph % 06/14/2022 30.4  % Final    Mono % 06/14/2022 6.2  % Final    Eosinophil %  06/14/2022 3.8  % Final    Basophil % 06/14/2022 0.5  % Final    Glucose 06/14/2022 94  65 - 139 mg/dL Final    BUN 06/14/2022 17  7 - 25 mg/dL Final    Creatinine 06/14/2022 1.08 (H)  0.60 - 0.88 mg/dL Final    eGFR if non  06/14/2022 48 (L)  > OR = 60 mL/min/1.73m2 Final    eGFR if  06/14/2022 55 (L)  > OR = 60 mL/min/1.73m2 Final    BUN/Creatinine Ratio 06/14/2022 16  6 - 22 (calc) Final    Sodium 06/14/2022 143  135 - 146 mmol/L Final    Potassium 06/14/2022 3.6  3.5 - 5.3 mmol/L Final    Chloride 06/14/2022 107  98 - 110 mmol/L Final    CO2 06/14/2022 29  20 - 32 mmol/L Final    Calcium 06/14/2022 9.2  8.6 - 10.4 mg/dL Final    Total Protein 06/14/2022 6.5  6.1 - 8.1 g/dL Final    Albumin 06/14/2022 4.1  3.6 - 5.1 g/dL Final    Globulin, Total 06/14/2022 2.4  1.9 - 3.7 g/dL (calc) Final    Albumin/Globulin Ratio 06/14/2022 1.7  1.0 - 2.5 (calc) Final    Total Bilirubin 06/14/2022 0.4  0.2 - 1.2 mg/dL Final    Alkaline Phosphatase 06/14/2022 40  37 - 153 U/L Final    AST 06/14/2022 20  10 - 35 U/L Final    ALT 06/14/2022 13  6 - 29 U/L Final    Cholesterol 06/14/2022 163  <200 mg/dL Final    HDL 06/14/2022 48 (L)  > OR = 50 mg/dL Final    Triglycerides 06/14/2022 273 (H)  <150 mg/dL Final    LDL Cholesterol 06/14/2022 79  mg/dL (calc) Final    HDL/Cholesterol Ratio 06/14/2022 3.4  <5.0 (calc) Final    Non HDL Chol. (LDL+VLDL) 06/14/2022 115  <130 mg/dL (calc) Final    TSH w/reflex to FT4 06/14/2022 4.30  0.40 - 4.50 mIU/L Final    Creatinine, Urine 06/14/2022 204  20 - 275 mg/dL Final    Microalb, Ur 06/14/2022 1.5  See Note: mg/dL Final    Microalb/Creat Ratio 06/14/2022 7  <30 mcg/mg creat Final    Color, UA 06/14/2022 DARK YELLOW  YELLOW Final    Appearance, UA 06/14/2022 CLEAR  CLEAR Final    Specific Gravity, UA 06/14/2022 1.022  1.001 - 1.035 Final    pH, UA 06/14/2022 < OR = 5.0  5.0 - 8.0 Final    Glucose, UA 06/14/2022 NEGATIVE  NEGATIVE Final    Bilirubin, UA 06/14/2022  NEGATIVE  NEGATIVE Final    Ketones, UA 06/14/2022 TRACE (A)  NEGATIVE Final    Occult Blood UA 06/14/2022 NEGATIVE  NEGATIVE Final    Protein, UA 06/14/2022 NEGATIVE  NEGATIVE Final    Nitrite, UA 06/14/2022 NEGATIVE  NEGATIVE Final    Leukocytes, UA 06/14/2022 1+ (A)  NEGATIVE Final    WBC Casts, UA 06/14/2022 0-5  < OR = 5 /HPF Final    RBC Casts, UA 06/14/2022 0-2  < OR = 2 /HPF Final    Squam Epithel, UA 06/14/2022 0-5  < OR = 5 /HPF Final    Bacteria, UA 06/14/2022 NONE SEEN  NONE SEEN /HPF Final    Hyaline Casts, UA 06/14/2022 0-5 (A)  NONE SEEN /LPF Final    Reflexive Urine Culture 06/14/2022    Final    Urine Culture, Routine 06/14/2022    Final    Glucose 07/20/2022 99  65 - 99 mg/dL Final    BUN 07/20/2022 21  7 - 25 mg/dL Final    Creatinine 07/20/2022 1.15 (H)  0.60 - 0.95 mg/dL Final    EGFR 07/20/2022 48 (L)  > OR = 60 mL/min/1.73m2 Final    BUN/Creatinine Ratio 07/20/2022 18  6 - 22 (calc) Final    Sodium 07/20/2022 143  135 - 146 mmol/L Final    Potassium 07/20/2022 3.8  3.5 - 5.3 mmol/L Final    Chloride 07/20/2022 106  98 - 110 mmol/L Final    CO2 07/20/2022 31  20 - 32 mmol/L Final    Calcium 07/20/2022 8.7  8.6 - 10.4 mg/dL Final    Total Protein 07/20/2022 6.3  6.1 - 8.1 g/dL Final    Albumin 07/20/2022 4.1  3.6 - 5.1 g/dL Final    Globulin, Total 07/20/2022 2.2  1.9 - 3.7 g/dL (calc) Final    Albumin/Globulin Ratio 07/20/2022 1.9  1.0 - 2.5 (calc) Final    Total Bilirubin 07/20/2022 0.5  0.2 - 1.2 mg/dL Final    Alkaline Phosphatase 07/20/2022 37  37 - 153 U/L Final    AST 07/20/2022 18  10 - 35 U/L Final    ALT 07/20/2022 13  6 - 29 U/L Final       Past Medical History:   Diagnosis Date    Anxiety     Depression     Diverticulitis     GERD (gastroesophageal reflux disease)     Hyperlipidemia     Hypertension     Ovarian cancer 2002    TIA (transient ischemic attack)      Social History     Socioeconomic History    Marital status:    Tobacco Use    Smoking status: Never    Smokeless  tobacco: Never   Substance and Sexual Activity    Alcohol use: Not Currently     Alcohol/week: 1.0 standard drink     Types: 1 Glasses of wine per week     Past Surgical History:   Procedure Laterality Date    HERNIA REPAIR      HYSTERECTOMY       History reviewed. No pertinent family history.    Review of patient's allergies indicates:   Allergen Reactions    Flagyl [metronidazole hcl]     Morphine     Pcn [penicillins]     Sulfa (sulfonamide antibiotics)        Current Outpatient Medications:     amLODIPine (NORVASC) 5 MG tablet, Take 1 tablet (5 mg total) by mouth once daily., Disp: 90 tablet, Rfl: 1    aspirin (ECOTRIN) 81 MG EC tablet, Take 81 mg by mouth once daily., Disp: , Rfl:     benzonatate (TESSALON) 200 MG capsule, Take 1 capsule (200 mg total) by mouth 3 (three) times daily as needed., Disp: 30 capsule, Rfl: 0    clopidogreL (PLAVIX) 75 mg tablet, Take 1 tablet (75 mg total) by mouth once daily., Disp: 90 tablet, Rfl: 1    clotrimazole-betamethasone 1-0.05% (LOTRISONE) cream, Apply topically 2 (two) times daily., Disp: 45 g, Rfl: 1    levothyroxine (SYNTHROID) 50 MCG tablet, Take 1 tablet (50 mcg total) by mouth before breakfast., Disp: 90 tablet, Rfl: 1    melatonin 10 mg Tab, Take by mouth., Disp: , Rfl:     pantoprazole (PROTONIX) 40 MG tablet, Take 1 tablet (40 mg total) by mouth once daily., Disp: 90 tablet, Rfl: 3    rosuvastatin (CRESTOR) 20 MG tablet, Take 1 tablet (20 mg total) by mouth once daily., Disp: 90 tablet, Rfl: 3    sertraline (ZOLOFT) 50 MG tablet, Take 1 tablet (50 mg total) by mouth once daily., Disp: 90 tablet, Rfl: 1    triamcinolone acetonide 0.1% (KENALOG) 0.1 % cream, Apply topically 2 (two) times daily., Disp: 45 g, Rfl: 5    Review of Systems   Constitutional:  Negative for chills and fever.   HENT:  Positive for congestion and sinus pressure. Negative for ear discharge, ear pain and sore throat.    Eyes:  Negative for discharge.   Respiratory:  Positive for cough. Negative  for shortness of breath.    Cardiovascular:  Negative for chest pain and leg swelling.        Objective:      Vitals:    09/12/22 1108   BP: 128/60   Pulse: 76   Temp: 98.1 °F (36.7 °C)   SpO2: 97%   Weight: 61.7 kg (136 lb)   Height: 5' (1.524 m)     Physical Exam  Vitals and nursing note reviewed.   Constitutional:       Appearance: Normal appearance. She is well-developed. She is not ill-appearing.   HENT:      Right Ear: External ear normal.      Left Ear: External ear normal.      Nose:      Right Turbinates: Enlarged.      Left Turbinates: Enlarged.      Right Sinus: Maxillary sinus tenderness present.      Left Sinus: Maxillary sinus tenderness present.      Mouth/Throat:      Pharynx: Posterior oropharyngeal erythema present.      Tonsils: No tonsillar exudate.   Cardiovascular:      Rate and Rhythm: Normal rate and regular rhythm.      Heart sounds: Normal heart sounds.   Pulmonary:      Breath sounds: Normal breath sounds.   Lymphadenopathy:      Cervical: No cervical adenopathy.   Neurological:      Mental Status: She is alert.         Assessment:       1. Cough    2. Sore throat    3. Pharyngitis, unspecified etiology    4. Hypertension, unspecified type    5. High risk medication use    6. Hypothyroidism, unspecified type    7. Gastroesophageal reflux disease, unspecified whether esophagitis present    8. Hyperlipidemia, unspecified hyperlipidemia type           Plan:       Cough  -     POCT COVID-19 Rapid Screening  -     POCT Influenza A/B Molecular    Sore throat  -     POCT COVID-19 Rapid Screening  -     POCT Influenza A/B Molecular    Pharyngitis, unspecified etiology    Hypertension, unspecified type    High risk medication use    Hypothyroidism, unspecified type    Gastroesophageal reflux disease, unspecified whether esophagitis present    Hyperlipidemia, unspecified hyperlipidemia type    Other orders  -     azithromycin (Z-JUDY) 250 MG tablet; Take 2 tablets by mouth on day 1; Take 1 tablet by  mouth on days 2-5  Dispense: 6 tablet; Refill: 0  -     benzonatate (TESSALON) 200 MG capsule; Take 1 capsule (200 mg total) by mouth 3 (three) times daily as needed.  Dispense: 30 capsule; Refill: 0    Follow up if symptoms worsen or fail to improve, for medication management.        9/21/2022 Linnette Ghotra

## 2022-09-22 ENCOUNTER — TELEPHONE (OUTPATIENT)
Dept: FAMILY MEDICINE | Facility: CLINIC | Age: 82
End: 2022-09-22

## 2022-09-22 NOTE — TELEPHONE ENCOUNTER
----- Message from Jimi Khan MA sent at 9/22/2022  9:37 AM CDT -----  Jazmín calling from [facility] because pt will possibly need an extraction and they need a clearance faxed over to them for it to be done with instruction for the pt on how to handle her plavix and they also need a list of the pt current medications as well, pt doesn't have it. Pt is currently at the office being seen. Fax # [phone]. CB# [phone]

## 2022-09-22 NOTE — TELEPHONE ENCOUNTER
----- Message from Miley Gonzales sent at 9/22/2022 10:30 AM CDT -----  Jazmín cruz called and would like to know if the patient clearance was sent over she stated that the patient is in the chair [phone] fax [phone]

## 2022-09-22 NOTE — LETTER
[address] [facility] [address]  [place] [address]  Phone: [phone]   [phone]                        MD Cristela Cortes MD Chequita Williams, MD Matthew Bassett, PA-C Linda Melerine, NP Jodi Powell, NP Hunter Reed, PA-C      Date: 09/22/2022        Patient: Hailey Mariano  YOB: 1940      Okay per Linnette Ghotra NP to be cleared for one dental extraction today since patient states she has not taken Plavix. Okay to restart tomorrow.         Sincerely,     Electronically Signed By: MEME Wray MA

## 2022-09-30 ENCOUNTER — TELEPHONE (OUTPATIENT)
Dept: ADMINISTRATIVE | Facility: CLINIC | Age: 82
End: 2022-09-30
Payer: MEDICARE

## 2022-09-30 NOTE — TELEPHONE ENCOUNTER
LVM to confirm AWV, alisson.    Reason for Call:  Other     Detailed comments: Pt just started Dupixent on 05/30. States within 24 hours of the first two injections she developed a red like sunburn on her hands, neck, face and it was itchy. She was contacted by the specialty pharmacy to schedule her next injection and informed them of these symptoms; they told her she was probably having an allergic reaction and should be seen by the provider - Please advise    Phone Number Patient can be reached at: Home number on file [phone] (home)    Best Time: Any    Can we leave a detailed message on this number? YES    Call taken on 6/10/2019 at 8:02 AM by Denise Behrendt

## 2022-10-03 ENCOUNTER — OFFICE VISIT (OUTPATIENT)
Dept: FAMILY MEDICINE | Facility: CLINIC | Age: 82
End: 2022-10-03
Payer: MEDICARE

## 2022-10-03 VITALS
WEIGHT: 139.13 LBS | DIASTOLIC BLOOD PRESSURE: 76 MMHG | BODY MASS INDEX: 27.32 KG/M2 | RESPIRATION RATE: 18 BRPM | OXYGEN SATURATION: 98 % | SYSTOLIC BLOOD PRESSURE: 128 MMHG | TEMPERATURE: 99 F | HEIGHT: 60 IN | HEART RATE: 68 BPM

## 2022-10-03 DIAGNOSIS — E03.9 HYPOTHYROIDISM, UNSPECIFIED TYPE: ICD-10-CM

## 2022-10-03 DIAGNOSIS — I10 HYPERTENSION, UNSPECIFIED TYPE: ICD-10-CM

## 2022-10-03 DIAGNOSIS — E78.5 HYPERLIPIDEMIA, UNSPECIFIED HYPERLIPIDEMIA TYPE: ICD-10-CM

## 2022-10-03 DIAGNOSIS — Z86.73 HISTORY OF TIA (TRANSIENT ISCHEMIC ATTACK): ICD-10-CM

## 2022-10-03 DIAGNOSIS — E66.3 OVERWEIGHT (BMI 25.0-29.9): ICD-10-CM

## 2022-10-03 DIAGNOSIS — Z00.00 ENCOUNTER FOR PREVENTIVE HEALTH EXAMINATION: Primary | ICD-10-CM

## 2022-10-03 DIAGNOSIS — Z85.43 HISTORY OF OVARIAN CANCER: ICD-10-CM

## 2022-10-03 DIAGNOSIS — N18.31 STAGE 3A CHRONIC KIDNEY DISEASE: ICD-10-CM

## 2022-10-03 DIAGNOSIS — N39.3 STRESS INCONTINENCE IN FEMALE: ICD-10-CM

## 2022-10-03 DIAGNOSIS — Z74.09 OTHER REDUCED MOBILITY: ICD-10-CM

## 2022-10-03 PROBLEM — I67.2 INTRACRANIAL ATHEROSCLEROSIS: Status: ACTIVE | Noted: 2017-05-17

## 2022-10-03 PROBLEM — I67.89 CEREBRAL MICROVASCULAR DISEASE: Status: ACTIVE | Noted: 2017-05-05

## 2022-10-03 PROBLEM — G47.00 INSOMNIA: Status: ACTIVE | Noted: 2019-03-11

## 2022-10-03 PROBLEM — F41.1 GENERALIZED ANXIETY DISORDER: Status: ACTIVE | Noted: 2018-04-17

## 2022-10-03 PROCEDURE — G0439 PPPS, SUBSEQ VISIT: HCPCS | Mod: S$GLB,,, | Performed by: FAMILY MEDICINE

## 2022-10-03 PROCEDURE — G0439 PR MEDICARE ANNUAL WELLNESS SUBSEQUENT VISIT: ICD-10-PCS | Mod: S$GLB,,, | Performed by: FAMILY MEDICINE

## 2022-10-03 RX ORDER — CLONAZEPAM 0.5 MG/1
0.5 TABLET ORAL ONCE AS NEEDED
COMMUNITY

## 2022-10-03 NOTE — PATIENT INSTRUCTIONS
Counseling and Referral of Other Preventative  (Italic type indicates deductible and co-insurance are waived)    Patient Name: Hailey Mariano  Today's Date: 10/3/2022    Health Maintenance       Date Due Completion Date    TETANUS VACCINE Never done ---    DEXA Scan Never done ---    Pneumococcal Vaccines (Age 65+) (2 - PPSV23 if available, else PCV20) 09/13/2019 9/13/2018    COVID-19 Vaccine (4 - Booster for Pfizer series) 01/24/2022 11/29/2021    Influenza Vaccine (1) 09/01/2022 11/8/2021    Override on 10/1/2019: Done    Lipid Panel 06/14/2027 6/14/2022        No orders of the defined types were placed in this encounter.    The following information is provided to all patients.  This information is to help you find resources for any of the problems found today that may be affecting your health:                Living healthy guide: www.[facility].[place].[facility]      Understanding Diabetes: www.diabetes.org      Eating healthy: www.cdc.gov/healthyweight      CDC home safety checklist: www.cdc.gov/steadi/patient.html      Agency on Aging: www.goea.[place].[facility]      Alcoholics anonymous (AA): www.aa.org      Physical Activity: www.[place].nih.gov/jq0spdx      Tobacco use: www.quitwithusla.org

## 2022-10-03 NOTE — PROGRESS NOTES
Hailey Mariano presented for a  Medicare AWV and comprehensive Health Risk Assessment today. The following components were reviewed and updated:    Medical history  Family History  Social history  Allergies and Current Medications  Health Risk Assessment  Health Maintenance  Care Team         ** See Completed Assessments for Annual Wellness Visit within the encounter summary.**         The following assessments were completed:  Living Situation  CAGE  Depression Screening  Timed Get Up and Go  Whisper Test  Cognitive Function Screening  Nutrition Screening  ADL Screening  PAQ Screening          Vitals:    10/03/22 0933   BP: 128/76   BP Location: Left arm   Patient Position: Sitting   BP Method: Medium (Manual)   Pulse: 68   Resp: 18   Temp: 98.6 °F (37 °C)   SpO2: 98%   Weight: 63.1 kg (139 lb 1.8 oz)   Height: 5' (1.524 m)     Body mass index is 27.17 kg/m².  Physical Exam  Vitals reviewed.   Constitutional:       Appearance: Normal appearance.   HENT:      Head: Normocephalic and atraumatic.   Eyes:      Pupils: Pupils are equal, round, and reactive to light.   Pulmonary:      Effort: Pulmonary effort is normal. No respiratory distress.   Skin:     General: Skin is warm and dry.   Neurological:      General: No focal deficit present.      Mental Status: She is alert and oriented to person, place, and time.   Psychiatric:         Mood and Affect: Mood normal.         Behavior: Behavior normal.     The ASCVD Risk score (Marah DK, et al., 2019) failed to calculate for the following reasons:    The 2019 ASCVD risk score is only valid for ages 40 to 79        Diagnoses and health risks identified today and associated recommendations/orders:    1. Encounter for preventive health examination    2. Overweight (BMI 25.0-29.9)  Body mass index is 27.17 kg/m².  Continue healthy diet and regular exercise as tolerated.  Continue medications as prescribed.  Follow up with PCP, Linnette Ghotra NP     3. Hyperlipidemia,  unspecified hyperlipidemia type  Stable  Continue medications as prescribed.  Follow up with PCP and cardiology, Dr Ding     4. History of TIA (transient ischemic attack)  Stable  Continue medications as prescribed.  Follow up with PCP, cardiology, and neurology, Dr Floyd    5. Hypothyroidism, unspecified type  Stable  Continue medications as prescribed.  Follow up with PCP     6. Hypertension, unspecified type  Stable  Continue medications as prescribed.  Follow up with PCP and cardio    7. Stage 3a chronic kidney disease  Stable  Continue medications as prescribed.  Follow up with PCP     8. History of ovarian cancer  Stable  Continue medications as prescribed.  Follow up with PCP and hematology/oncology    9. Stress incontinence in female  Stable  Continue medications as prescribed.  Follow up with PCP and urology    10. Other reduced mobility  Stable without assistive device    Provided Hailey with a 5-10 year written screening schedule and personal prevention plan. Recommendations were developed using the USPSTF age appropriate recommendations. Education, counseling, and referrals were provided as needed. After Visit Summary printed and given to patient which includes a list of additional screenings\tests needed.    Follow up if symptoms worsen or fail to improve, for scheduled appt, 1 year for AWV.    Jenna Golden, MEME          I offered to discuss advanced care planning, including how to pick a person who would make decisions for you if you were unable to make them for yourself, called a health care power of , and what kind of decisions you might make such as use of life sustaining treatments such as ventilators and tube feeding when faced with a life limiting illness recorded on a living will that they will need to know. (How you want to be cared for as you near the end of your natural life)     X  Patient has advanced directives written and agrees to provide copies to the institution.

## 2022-10-03 NOTE — PROGRESS NOTES
Review for Opioid Screening: Pt does not have Rx for Opioids   Review for Substance Use Disorders: Patient does not use substance

## 2022-12-13 ENCOUNTER — OFFICE VISIT (OUTPATIENT)
Dept: FAMILY MEDICINE | Facility: CLINIC | Age: 82
End: 2022-12-13
Payer: MEDICARE

## 2022-12-13 VITALS
DIASTOLIC BLOOD PRESSURE: 70 MMHG | WEIGHT: 137 LBS | BODY MASS INDEX: 26.9 KG/M2 | HEART RATE: 72 BPM | HEIGHT: 60 IN | SYSTOLIC BLOOD PRESSURE: 130 MMHG

## 2022-12-13 DIAGNOSIS — Z79.899 HIGH RISK MEDICATION USE: ICD-10-CM

## 2022-12-13 DIAGNOSIS — J40 BRONCHITIS: ICD-10-CM

## 2022-12-13 DIAGNOSIS — E78.5 HYPERLIPIDEMIA, UNSPECIFIED HYPERLIPIDEMIA TYPE: ICD-10-CM

## 2022-12-13 DIAGNOSIS — I10 HYPERTENSION, UNSPECIFIED TYPE: Primary | ICD-10-CM

## 2022-12-13 DIAGNOSIS — F41.9 ANXIETY: ICD-10-CM

## 2022-12-13 DIAGNOSIS — Z86.73 HISTORY OF TIA (TRANSIENT ISCHEMIC ATTACK): ICD-10-CM

## 2022-12-13 DIAGNOSIS — G47.00 INSOMNIA, UNSPECIFIED TYPE: ICD-10-CM

## 2022-12-13 DIAGNOSIS — E03.9 HYPOTHYROIDISM, UNSPECIFIED TYPE: ICD-10-CM

## 2022-12-13 DIAGNOSIS — F32.A DEPRESSION, UNSPECIFIED DEPRESSION TYPE: ICD-10-CM

## 2022-12-13 DIAGNOSIS — K21.9 GASTROESOPHAGEAL REFLUX DISEASE, UNSPECIFIED WHETHER ESOPHAGITIS PRESENT: ICD-10-CM

## 2022-12-13 PROCEDURE — 99214 OFFICE O/P EST MOD 30 MIN: CPT | Mod: 25,S$GLB,, | Performed by: NURSE PRACTITIONER

## 2022-12-13 PROCEDURE — 99214 PR OFFICE/OUTPT VISIT, EST, LEVL IV, 30-39 MIN: ICD-10-PCS | Mod: 25,S$GLB,, | Performed by: NURSE PRACTITIONER

## 2022-12-13 PROCEDURE — 96372 PR INJECTION,THERAP/PROPH/DIAG2ST, IM OR SUBCUT: ICD-10-PCS | Mod: S$GLB,,, | Performed by: NURSE PRACTITIONER

## 2022-12-13 PROCEDURE — 96372 THER/PROPH/DIAG INJ SC/IM: CPT | Mod: S$GLB,,, | Performed by: NURSE PRACTITIONER

## 2022-12-13 RX ORDER — CLOPIDOGREL BISULFATE 75 MG/1
75 TABLET ORAL DAILY
Qty: 90 TABLET | Refills: 1 | Status: SHIPPED | OUTPATIENT
Start: 2022-12-13 | End: 2023-08-21 | Stop reason: SDUPTHER

## 2022-12-13 RX ORDER — ROSUVASTATIN CALCIUM 20 MG/1
20 TABLET, COATED ORAL DAILY
Qty: 90 TABLET | Refills: 3 | Status: SHIPPED | OUTPATIENT
Start: 2022-12-13 | End: 2023-12-18 | Stop reason: SDUPTHER

## 2022-12-13 RX ORDER — SERTRALINE HYDROCHLORIDE 50 MG/1
50 TABLET, FILM COATED ORAL DAILY
Qty: 90 TABLET | Refills: 1 | Status: SHIPPED | OUTPATIENT
Start: 2022-12-13 | End: 2023-08-21 | Stop reason: SDUPTHER

## 2022-12-13 RX ORDER — DOXYCYCLINE HYCLATE 100 MG
100 TABLET ORAL 2 TIMES DAILY
Qty: 20 TABLET | Refills: 0 | Status: SHIPPED | OUTPATIENT
Start: 2022-12-13 | End: 2023-10-04

## 2022-12-13 RX ORDER — CLONAZEPAM 0.5 MG/1
0.5 TABLET ORAL ONCE AS NEEDED
Qty: 45 TABLET | Refills: 1 | Status: CANCELLED | OUTPATIENT
Start: 2022-12-13 | End: 2022-12-13

## 2022-12-13 RX ORDER — PANTOPRAZOLE SODIUM 40 MG/1
40 TABLET, DELAYED RELEASE ORAL DAILY
Qty: 90 TABLET | Refills: 3 | Status: SHIPPED | OUTPATIENT
Start: 2022-12-13 | End: 2023-12-18 | Stop reason: SDUPTHER

## 2022-12-13 RX ORDER — DEXAMETHASONE SODIUM PHOSPHATE 4 MG/ML
8 INJECTION, SOLUTION INTRA-ARTICULAR; INTRALESIONAL; INTRAMUSCULAR; INTRAVENOUS; SOFT TISSUE ONCE
Status: COMPLETED | OUTPATIENT
Start: 2022-12-13 | End: 2022-12-13

## 2022-12-13 RX ORDER — LEVOTHYROXINE SODIUM 50 UG/1
50 TABLET ORAL
Qty: 90 TABLET | Refills: 1 | Status: SHIPPED | OUTPATIENT
Start: 2022-12-13 | End: 2023-10-16 | Stop reason: SDUPTHER

## 2022-12-13 RX ADMIN — DEXAMETHASONE SODIUM PHOSPHATE 8 MG: 4 INJECTION, SOLUTION INTRA-ARTICULAR; INTRALESIONAL; INTRAMUSCULAR; INTRAVENOUS; SOFT TISSUE at 11:12

## 2022-12-13 NOTE — PROGRESS NOTES
SUBJECTIVE:    Patient ID: Hailey Mariano is a 82 y.o. female.    Chief Complaint: Medication Management (Brought bottles, DEXA declined, cough non productive x1 week after cruise, has tessalon pearls for// SW)    82-year-old female presents for check up.   is present during the exam. Treated for htn hyperlipidemia gerd and anxiety. Due for labs. Taking medication as prescribed. bp in office today is well controlled. Does not check at home. Recently returned from cruise . Reports since being back has had ongoing cough. No fever. Productive cough at times.  with similar symptoms.    Office Visit on 12/13/2022   Component Date Value Ref Range Status    Glucose 12/13/2022 90  65 - 139 mg/dL Final    BUN 12/13/2022 17  7 - 25 mg/dL Final    Creatinine 12/13/2022 1.08 (H)  0.60 - 0.95 mg/dL Final    eGFR 12/13/2022 51 (L)  > OR = 60 mL/min/1.73m2 Final    BUN/Creatinine Ratio 12/13/2022 16  6 - 22 (calc) Final    Sodium 12/13/2022 144  135 - 146 mmol/L Final    Potassium 12/13/2022 3.0 (L)  3.5 - 5.3 mmol/L Final    Chloride 12/13/2022 105  98 - 110 mmol/L Final    CO2 12/13/2022 31  20 - 32 mmol/L Final    Calcium 12/13/2022 8.8  8.6 - 10.4 mg/dL Final    Total Protein 12/13/2022 6.5  6.1 - 8.1 g/dL Final    Albumin 12/13/2022 4.2  3.6 - 5.1 g/dL Final    Globulin, Total 12/13/2022 2.3  1.9 - 3.7 g/dL (calc) Final    Albumin/Globulin Ratio 12/13/2022 1.8  1.0 - 2.5 (calc) Final    Total Bilirubin 12/13/2022 0.5  0.2 - 1.2 mg/dL Final    Alkaline Phosphatase 12/13/2022 37  37 - 153 U/L Final    AST 12/13/2022 13  10 - 35 U/L Final    ALT 12/13/2022 9  6 - 29 U/L Final    Cholesterol 12/13/2022 133  <200 mg/dL Final    HDL 12/13/2022 46 (L)  > OR = 50 mg/dL Final    Triglycerides 12/13/2022 165 (H)  <150 mg/dL Final    LDL Cholesterol 12/13/2022 63  mg/dL (calc) Final    HDL/Cholesterol Ratio 12/13/2022 2.9  <5.0 (calc) Final    Non HDL Chol. (LDL+VLDL) 12/13/2022 87  <130 mg/dL (calc) Final    TSH  w/reflex to FT4 12/13/2022 1.59  0.40 - 4.50 mIU/L Final   Office Visit on 09/12/2022   Component Date Value Ref Range Status    POC Rapid COVID 09/12/2022 Negative  Negative Final     Acceptable 09/12/2022 Yes   Final    POC Molecular Influenza A Ag 09/12/2022 Negative  Negative, Not Reported Final    POC Molecular Influenza B Ag 09/12/2022 Negative  Negative, Not Reported Final     Acceptable 09/12/2022 Yes   Final   Office Visit on 06/14/2022   Component Date Value Ref Range Status    WBC 06/14/2022 7.5  3.8 - 10.8 Thousand/uL Final    RBC 06/14/2022 4.08  3.80 - 5.10 Million/uL Final    Hemoglobin 06/14/2022 11.5 (L)  11.7 - 15.5 g/dL Final    Hematocrit 06/14/2022 35.2  35.0 - 45.0 % Final    MCV 06/14/2022 86.3  80.0 - 100.0 fL Final    MCH 06/14/2022 28.2  27.0 - 33.0 pg Final    MCHC 06/14/2022 32.7  32.0 - 36.0 g/dL Final    RDW 06/14/2022 12.7  11.0 - 15.0 % Final    Platelets 06/14/2022 220  140 - 400 Thousand/uL Final    MPV 06/14/2022 10.6  7.5 - 12.5 fL Final    Neutrophils, Abs 06/14/2022 4,433  1,500 - 7,800 cells/uL Final    Lymph # 06/14/2022 2,280  850 - 3,900 cells/uL Final    Mono # 06/14/2022 465  200 - 950 cells/uL Final    Eos # 06/14/2022 285  15 - 500 cells/uL Final    Baso # 06/14/2022 38  0 - 200 cells/uL Final    Neutrophils Relative 06/14/2022 59.1  % Final    Lymph % 06/14/2022 30.4  % Final    Mono % 06/14/2022 6.2  % Final    Eosinophil % 06/14/2022 3.8  % Final    Basophil % 06/14/2022 0.5  % Final    Glucose 06/14/2022 94  65 - 139 mg/dL Final    BUN 06/14/2022 17  7 - 25 mg/dL Final    Creatinine 06/14/2022 1.08 (H)  0.60 - 0.88 mg/dL Final    eGFR if non  06/14/2022 48 (L)  > OR = 60 mL/min/1.73m2 Final    eGFR if  06/14/2022 55 (L)  > OR = 60 mL/min/1.73m2 Final    BUN/Creatinine Ratio 06/14/2022 16  6 - 22 (calc) Final    Sodium 06/14/2022 143  135 - 146 mmol/L Final    Potassium 06/14/2022 3.6  3.5 - 5.3 mmol/L  Final    Chloride 06/14/2022 107  98 - 110 mmol/L Final    CO2 06/14/2022 29  20 - 32 mmol/L Final    Calcium 06/14/2022 9.2  8.6 - 10.4 mg/dL Final    Total Protein 06/14/2022 6.5  6.1 - 8.1 g/dL Final    Albumin 06/14/2022 4.1  3.6 - 5.1 g/dL Final    Globulin, Total 06/14/2022 2.4  1.9 - 3.7 g/dL (calc) Final    Albumin/Globulin Ratio 06/14/2022 1.7  1.0 - 2.5 (calc) Final    Total Bilirubin 06/14/2022 0.4  0.2 - 1.2 mg/dL Final    Alkaline Phosphatase 06/14/2022 40  37 - 153 U/L Final    AST 06/14/2022 20  10 - 35 U/L Final    ALT 06/14/2022 13  6 - 29 U/L Final    Cholesterol 06/14/2022 163  <200 mg/dL Final    HDL 06/14/2022 48 (L)  > OR = 50 mg/dL Final    Triglycerides 06/14/2022 273 (H)  <150 mg/dL Final    LDL Cholesterol 06/14/2022 79  mg/dL (calc) Final    HDL/Cholesterol Ratio 06/14/2022 3.4  <5.0 (calc) Final    Non HDL Chol. (LDL+VLDL) 06/14/2022 115  <130 mg/dL (calc) Final    TSH w/reflex to FT4 06/14/2022 4.30  0.40 - 4.50 mIU/L Final    Creatinine, Urine 06/14/2022 204  20 - 275 mg/dL Final    Microalb, Ur 06/14/2022 1.5  See Note: mg/dL Final    Microalb/Creat Ratio 06/14/2022 7  <30 mcg/mg creat Final    Color, UA 06/14/2022 DARK YELLOW  YELLOW Final    Appearance, UA 06/14/2022 CLEAR  CLEAR Final    Specific Gravity, UA 06/14/2022 1.022  1.001 - 1.035 Final    pH, UA 06/14/2022 < OR = 5.0  5.0 - 8.0 Final    Glucose, UA 06/14/2022 NEGATIVE  NEGATIVE Final    Bilirubin, UA 06/14/2022 NEGATIVE  NEGATIVE Final    Ketones, UA 06/14/2022 TRACE (A)  NEGATIVE Final    Occult Blood UA 06/14/2022 NEGATIVE  NEGATIVE Final    Protein, UA 06/14/2022 NEGATIVE  NEGATIVE Final    Nitrite, UA 06/14/2022 NEGATIVE  NEGATIVE Final    Leukocytes, UA 06/14/2022 1+ (A)  NEGATIVE Final    WBC Casts, UA 06/14/2022 0-5  < OR = 5 /HPF Final    RBC Casts, UA 06/14/2022 0-2  < OR = 2 /HPF Final    Squam Epithel, UA 06/14/2022 0-5  < OR = 5 /HPF Final    Bacteria, UA 06/14/2022 NONE SEEN  NONE SEEN /HPF Final    Hyaline  Casts, UA 06/14/2022 0-5 (A)  NONE SEEN /LPF Final    Reflexive Urine Culture 06/14/2022    Final    Urine Culture, Routine 06/14/2022    Final    Glucose 07/20/2022 99  65 - 99 mg/dL Final    BUN 07/20/2022 21  7 - 25 mg/dL Final    Creatinine 07/20/2022 1.15 (H)  0.60 - 0.95 mg/dL Final    eGFR 07/20/2022 48 (L)  > OR = 60 mL/min/1.73m2 Final    BUN/Creatinine Ratio 07/20/2022 18  6 - 22 (calc) Final    Sodium 07/20/2022 143  135 - 146 mmol/L Final    Potassium 07/20/2022 3.8  3.5 - 5.3 mmol/L Final    Chloride 07/20/2022 106  98 - 110 mmol/L Final    CO2 07/20/2022 31  20 - 32 mmol/L Final    Calcium 07/20/2022 8.7  8.6 - 10.4 mg/dL Final    Total Protein 07/20/2022 6.3  6.1 - 8.1 g/dL Final    Albumin 07/20/2022 4.1  3.6 - 5.1 g/dL Final    Globulin, Total 07/20/2022 2.2  1.9 - 3.7 g/dL (calc) Final    Albumin/Globulin Ratio 07/20/2022 1.9  1.0 - 2.5 (calc) Final    Total Bilirubin 07/20/2022 0.5  0.2 - 1.2 mg/dL Final    Alkaline Phosphatase 07/20/2022 37  37 - 153 U/L Final    AST 07/20/2022 18  10 - 35 U/L Final    ALT 07/20/2022 13  6 - 29 U/L Final       Past Medical History:   Diagnosis Date    Anxiety     Depression     Diverticulitis     GERD (gastroesophageal reflux disease)     Hyperlipidemia     Hypertension     Ovarian cancer 2002    TIA (transient ischemic attack)      Social History     Socioeconomic History    Marital status:    Tobacco Use    Smoking status: Never    Smokeless tobacco: Never   Substance and Sexual Activity    Alcohol use: Not Currently     Alcohol/week: 1.0 standard drink     Types: 1 Glasses of wine per week     Past Surgical History:   Procedure Laterality Date    HERNIA REPAIR      HYSTERECTOMY       History reviewed. No pertinent family history.    Review of patient's allergies indicates:   Allergen Reactions    Flagyl [metronidazole hcl]     Morphine     Pcn [penicillins]     Sulfa (sulfonamide antibiotics)        Current Outpatient Medications:     clonazePAM  (KLONOPIN) 0.5 MG tablet, Take 0.5 mg by mouth once as needed for Anxiety., Disp: , Rfl:     clotrimazole-betamethasone 1-0.05% (LOTRISONE) cream, Apply topically 2 (two) times daily., Disp: 45 g, Rfl: 1    triamcinolone acetonide 0.1% (KENALOG) 0.1 % cream, Apply topically 2 (two) times daily., Disp: 45 g, Rfl: 5    clonazePAM (KLONOPIN) 0.5 MG tablet, Take 1 tablet (0.5 mg total) by mouth 2 (two) times daily as needed for Anxiety., Disp: 45 tablet, Rfl: 0    clopidogreL (PLAVIX) 75 mg tablet, Take 1 tablet (75 mg total) by mouth once daily., Disp: 90 tablet, Rfl: 1    doxycycline (VIBRA-TABS) 100 MG tablet, Take 1 tablet (100 mg total) by mouth 2 (two) times daily., Disp: 20 tablet, Rfl: 0    levothyroxine (SYNTHROID) 50 MCG tablet, Take 1 tablet (50 mcg total) by mouth before breakfast., Disp: 90 tablet, Rfl: 1    pantoprazole (PROTONIX) 40 MG tablet, Take 1 tablet (40 mg total) by mouth once daily., Disp: 90 tablet, Rfl: 3    rosuvastatin (CRESTOR) 20 MG tablet, Take 1 tablet (20 mg total) by mouth once daily., Disp: 90 tablet, Rfl: 3    sertraline (ZOLOFT) 50 MG tablet, Take 1 tablet (50 mg total) by mouth once daily., Disp: 90 tablet, Rfl: 1    Review of Systems   Constitutional:  Negative for chills, fever and unexpected weight change.   HENT:  Positive for postnasal drip and rhinorrhea. Negative for ear pain and sore throat.    Eyes:  Negative for pain and visual disturbance.   Respiratory:  Positive for cough. Negative for shortness of breath.    Cardiovascular:  Negative for chest pain, palpitations and leg swelling.   Gastrointestinal:  Negative for abdominal pain, diarrhea, nausea and vomiting.   Genitourinary:  Negative for difficulty urinating, hematuria and vaginal bleeding.   Musculoskeletal:  Negative for arthralgias.   Skin:  Negative for rash.   Neurological:  Negative for dizziness, weakness and headaches.   Psychiatric/Behavioral:  Negative for agitation and sleep disturbance. The patient is not  nervous/anxious.         Objective:      Vitals:    12/13/22 1007   BP: 130/70   Pulse: 72   Weight: 62.1 kg (137 lb)   Height: 5' (1.524 m)     Physical Exam  Vitals and nursing note reviewed.   Constitutional:       Appearance: Normal appearance. She is well-developed.   HENT:      Head: Normocephalic.      Right Ear: External ear normal.      Left Ear: External ear normal.      Nose:      Right Turbinates: Not pale.      Left Turbinates: Not pale.      Right Sinus: Frontal sinus tenderness present.      Left Sinus: Frontal sinus tenderness present.   Eyes:      Conjunctiva/sclera: Conjunctivae normal.      Pupils: Pupils are equal, round, and reactive to light.   Neck:      Vascular: No JVD.   Cardiovascular:      Rate and Rhythm: Normal rate and regular rhythm.      Heart sounds: No murmur heard.  Pulmonary:      Effort: Pulmonary effort is normal.      Breath sounds: Normal breath sounds.   Abdominal:      General: Bowel sounds are normal.      Palpations: Abdomen is soft.   Musculoskeletal:         General: No deformity. Normal range of motion.      Cervical back: Normal range of motion and neck supple.   Lymphadenopathy:      Cervical: No cervical adenopathy.   Skin:     General: Skin is warm and dry.      Findings: No rash.   Neurological:      Mental Status: She is alert and oriented to person, place, and time.      Gait: Gait normal.   Psychiatric:         Speech: Speech normal.         Behavior: Behavior normal.         Assessment:       1. Hypertension, unspecified type    2. Hypothyroidism, unspecified type    3. Anxiety    4. Depression, unspecified depression type    5. Hyperlipidemia, unspecified hyperlipidemia type    6. History of TIA (transient ischemic attack)    7. Gastroesophageal reflux disease, unspecified whether esophagitis present    8. Insomnia, unspecified type    9. High risk medication use    10. Bronchitis           Plan:       Hypertension, unspecified type    Hypothyroidism,  unspecified type  -     levothyroxine (SYNTHROID) 50 MCG tablet; Take 1 tablet (50 mcg total) by mouth before breakfast.  Dispense: 90 tablet; Refill: 1  -     TSH w/reflex to FT4; Future; Expected date: 12/13/2022    Anxiety  -     sertraline (ZOLOFT) 50 MG tablet; Take 1 tablet (50 mg total) by mouth once daily.  Dispense: 90 tablet; Refill: 1    Depression, unspecified depression type  -     sertraline (ZOLOFT) 50 MG tablet; Take 1 tablet (50 mg total) by mouth once daily.  Dispense: 90 tablet; Refill: 1    Hyperlipidemia, unspecified hyperlipidemia type  -     rosuvastatin (CRESTOR) 20 MG tablet; Take 1 tablet (20 mg total) by mouth once daily.  Dispense: 90 tablet; Refill: 3  -     Comprehensive Metabolic Panel; Future; Expected date: 12/13/2022  -     Lipid Panel; Future; Expected date: 12/13/2022    History of TIA (transient ischemic attack)    Gastroesophageal reflux disease, unspecified whether esophagitis present  -     pantoprazole (PROTONIX) 40 MG tablet; Take 1 tablet (40 mg total) by mouth once daily.  Dispense: 90 tablet; Refill: 3    Insomnia, unspecified type    High risk medication use  -     clopidogreL (PLAVIX) 75 mg tablet; Take 1 tablet (75 mg total) by mouth once daily.  Dispense: 90 tablet; Refill: 1    Bronchitis    Other orders  -     dexAMETHasone injection 8 mg  -     doxycycline (VIBRA-TABS) 100 MG tablet; Take 1 tablet (100 mg total) by mouth 2 (two) times daily.  Dispense: 20 tablet; Refill: 0      Follow up in about 3 months (around 3/13/2023), or if symptoms worsen or fail to improve, for medication management.        12/29/2022 Linnette Ghotra

## 2022-12-14 LAB
ALBUMIN SERPL-MCNC: 4.2 G/DL (ref 3.6–5.1)
ALBUMIN/GLOB SERPL: 1.8 (CALC) (ref 1–2.5)
ALP SERPL-CCNC: 37 U/L (ref 37–153)
ALT SERPL-CCNC: 9 U/L (ref 6–29)
AST SERPL-CCNC: 13 U/L (ref 10–35)
BILIRUB SERPL-MCNC: 0.5 MG/DL (ref 0.2–1.2)
BUN SERPL-MCNC: 17 MG/DL (ref 7–25)
BUN/CREAT SERPL: 16 (CALC) (ref 6–22)
CALCIUM SERPL-MCNC: 8.8 MG/DL (ref 8.6–10.4)
CHLORIDE SERPL-SCNC: 105 MMOL/L (ref 98–110)
CHOLEST SERPL-MCNC: 133 MG/DL
CHOLEST/HDLC SERPL: 2.9 (CALC)
CO2 SERPL-SCNC: 31 MMOL/L (ref 20–32)
CREAT SERPL-MCNC: 1.08 MG/DL (ref 0.6–0.95)
EGFR: 51 ML/MIN/1.73M2
GLOBULIN SER CALC-MCNC: 2.3 G/DL (CALC) (ref 1.9–3.7)
GLUCOSE SERPL-MCNC: 90 MG/DL (ref 65–139)
HDLC SERPL-MCNC: 46 MG/DL
LDLC SERPL CALC-MCNC: 63 MG/DL (CALC)
NONHDLC SERPL-MCNC: 87 MG/DL (CALC)
POTASSIUM SERPL-SCNC: 3 MMOL/L (ref 3.5–5.3)
PROT SERPL-MCNC: 6.5 G/DL (ref 6.1–8.1)
SODIUM SERPL-SCNC: 144 MMOL/L (ref 135–146)
TRIGL SERPL-MCNC: 165 MG/DL
TSH SERPL-ACNC: 1.59 MIU/L (ref 0.4–4.5)

## 2022-12-15 ENCOUNTER — TELEPHONE (OUTPATIENT)
Dept: FAMILY MEDICINE | Facility: CLINIC | Age: 82
End: 2022-12-15

## 2022-12-15 DIAGNOSIS — F41.9 ANXIETY: Primary | ICD-10-CM

## 2022-12-15 RX ORDER — POTASSIUM CHLORIDE 750 MG/1
20 CAPSULE, EXTENDED RELEASE ORAL DAILY
Qty: 6 CAPSULE | Refills: 0 | Status: SHIPPED | OUTPATIENT
Start: 2022-12-15 | End: 2022-12-18

## 2022-12-15 RX ORDER — CLONAZEPAM 0.5 MG/1
0.5 TABLET ORAL 2 TIMES DAILY PRN
Qty: 45 TABLET | Refills: 0 | Status: SHIPPED | OUTPATIENT
Start: 2022-12-15 | End: 2023-05-11 | Stop reason: SDUPTHER

## 2022-12-15 NOTE — TELEPHONE ENCOUNTER
----- Message from Miley Gonzales sent at 12/15/2022  7:37 AM CST -----   12/14/22 @ 3:09 PM : patient called and stated that she need to speak to someone about her medicine refill. Patient stated that her medicine refill was all messed up please give her a call at [phone]

## 2022-12-15 NOTE — TELEPHONE ENCOUNTER
----- Message from Jimi Khan MA sent at 12/15/2022  9:11 AM CST -----  Regarding: med issue  Pt calling because her clonazepam wasn't called in with the other meds. [phone]

## 2022-12-19 ENCOUNTER — TELEPHONE (OUTPATIENT)
Dept: FAMILY MEDICINE | Facility: CLINIC | Age: 82
End: 2022-12-19

## 2022-12-19 DIAGNOSIS — E87.6 HYPOKALEMIA: Primary | ICD-10-CM

## 2022-12-19 DIAGNOSIS — Z79.899 HIGH RISK MEDICATION USE: ICD-10-CM

## 2022-12-19 NOTE — TELEPHONE ENCOUNTER
----- Message from Nori Anguiano LPN sent at 12/15/2022  3:06 PM CST -----  Regarding: repeat labs  Linnette Ghotra NP Nurse Practitioner Signed   1:33 PM    Copy    Take k x 3 days. Repeat k in 1 week

## 2022-12-22 LAB — POTASSIUM SERPL-SCNC: 3.6 MMOL/L (ref 3.5–5.3)

## 2023-03-24 NOTE — TELEPHONE ENCOUNTER
----- Message from [facility], RT sent at 3/30/2020  9:21 AM CDT -----  Patient said she would like something to help her sleep. States this has been going on for years. Lopez Valencia. 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    Code Status: DNR    Allergies: Review of patient's allergies indicates:  No Known Allergies    Diet: Regular    Activities: As tolerated    Goals of Care Treatment Preferences:  Code Status: DNR      Nursing: Per Hospice Routine.      Meng Care: Empty Meng bag Q shift and PRN.  Change Meng every month.    Routine Skin for Bedridden Patients: Apply moisture barrier cream to all skin folds and   wet areas in perineal area daily and after baths and all bowel movements.    Oxygen: 2L NC, titrate PRN       Medication List        Start taking these medications      allopurinoL 300 MG tablet  Commonly known as: ZYLOPRIM  Take 1 tablet (300 mg total) by mouth 2 (two) times daily.     sevelamer carbonate 800 mg Tab  Commonly known as: RENVELA  Take 2 tablets (1,600 mg total) by mouth 3 (three) times daily with meals.     sodium bicarbonate 650 MG tablet  Take 1 tablet (650 mg total) by mouth  3 (three) times daily.            Continue taking these medications      ALPRAZolam 0.25 MG tablet  Commonly known as: XANAX     enalapril 20 MG tablet  Commonly known as: VASOTEC  Take 20 mg by mouth once daily.     furosemide 80 MG tablet  Commonly known as: LASIX            Stop taking these medications      amLODIPine 10 MG tablet  Commonly known as: NORVASC     aspirin 81 MG EC tablet  Commonly known as: ECOTRIN     montelukast 10 mg tablet  Commonly known as: SINGULAIR     terazosin 2 MG capsule  Commonly known as: HYTRIN     warfarin 4 MG tablet  Commonly known as: COUMADIN              Future Orders:  Hospice Medical Director may dictate new orders for comfortable care measures & sign death certificate.        _________________________________  Meredith Amin, RJ  03/24/2023

## 2023-04-17 ENCOUNTER — PATIENT MESSAGE (OUTPATIENT)
Dept: ADMINISTRATIVE | Facility: HOSPITAL | Age: 83
End: 2023-04-17
Payer: MEDICARE

## 2023-05-11 DIAGNOSIS — F41.9 ANXIETY: ICD-10-CM

## 2023-05-11 RX ORDER — CLONAZEPAM 0.5 MG/1
0.5 TABLET ORAL 2 TIMES DAILY PRN
Qty: 45 TABLET | Refills: 0 | Status: SHIPPED | OUTPATIENT
Start: 2023-05-11 | End: 2023-10-16 | Stop reason: SDUPTHER

## 2023-05-11 NOTE — TELEPHONE ENCOUNTER
----- Message from Coty Rodriguez sent at 5/11/2023  1:02 PM CDT -----  Vm-12:56 pt needs refill on clonazepam .5 mg   [facility]   [phone]

## 2023-06-20 ENCOUNTER — OFFICE VISIT (OUTPATIENT)
Dept: ORTHOPEDICS | Facility: CLINIC | Age: 83
End: 2023-06-20
Payer: MEDICARE

## 2023-06-20 VITALS
HEIGHT: 60 IN | DIASTOLIC BLOOD PRESSURE: 70 MMHG | WEIGHT: 137 LBS | SYSTOLIC BLOOD PRESSURE: 130 MMHG | BODY MASS INDEX: 26.9 KG/M2

## 2023-06-20 DIAGNOSIS — S82.832A OTHER CLOSED FRACTURE OF DISTAL END OF LEFT FIBULA, INITIAL ENCOUNTER: Primary | ICD-10-CM

## 2023-06-20 PROCEDURE — 27786 TREATMENT OF ANKLE FRACTURE: CPT | Mod: LT,S$GLB,, | Performed by: ORTHOPAEDIC SURGERY

## 2023-06-20 PROCEDURE — 99203 OFFICE O/P NEW LOW 30 MIN: CPT | Mod: 57,S$GLB,, | Performed by: ORTHOPAEDIC SURGERY

## 2023-06-20 PROCEDURE — 99203 PR OFFICE/OUTPT VISIT, NEW, LEVL III, 30-44 MIN: ICD-10-PCS | Mod: 57,S$GLB,, | Performed by: ORTHOPAEDIC SURGERY

## 2023-06-20 PROCEDURE — 27786 PR CLOSED RX DIST FIBULA FX: ICD-10-PCS | Mod: LT,S$GLB,, | Performed by: ORTHOPAEDIC SURGERY

## 2023-06-20 NOTE — PROGRESS NOTES
[facility] ORTHOPEDICS    Subjective:     Chief Complaint:   Chief Complaint   Patient presents with    Left Lower Leg - Pain     L Tib/Fib fx, pt fell last Tuesday, pt went to [facility], pt has no pain today       Past Medical History:   Diagnosis Date    Anxiety     Depression     Diverticulitis     GERD (gastroesophageal reflux disease)     Hyperlipidemia     Hypertension     Ovarian cancer 2002    TIA (transient ischemic attack)        Past Surgical History:   Procedure Laterality Date    HERNIA REPAIR      HYSTERECTOMY         Current Outpatient Medications   Medication Sig    clonazePAM (KLONOPIN) 0.5 MG tablet Take 0.5 mg by mouth once as needed for Anxiety.    clonazePAM (KLONOPIN) 0.5 MG tablet Take 1 tablet (0.5 mg total) by mouth 2 (two) times daily as needed for Anxiety.    clopidogreL (PLAVIX) 75 mg tablet Take 1 tablet (75 mg total) by mouth once daily.    clotrimazole-betamethasone 1-0.05% (LOTRISONE) cream Apply topically 2 (two) times daily.    doxycycline (VIBRA-TABS) 100 MG tablet Take 1 tablet (100 mg total) by mouth 2 (two) times daily.    levothyroxine (SYNTHROID) 50 MCG tablet Take 1 tablet (50 mcg total) by mouth before breakfast.    pantoprazole (PROTONIX) 40 MG tablet Take 1 tablet (40 mg total) by mouth once daily.    rosuvastatin (CRESTOR) 20 MG tablet Take 1 tablet (20 mg total) by mouth once daily.    sertraline (ZOLOFT) 50 MG tablet Take 1 tablet (50 mg total) by mouth once daily.    triamcinolone acetonide 0.1% (KENALOG) 0.1 % cream Apply topically 2 (two) times daily.     No current facility-administered medications for this visit.       Review of patient's allergies indicates:   Allergen Reactions    Flagyl [metronidazole hcl]     Morphine     Pcn [penicillins]     Sulfa (sulfonamide antibiotics)        No family history on file.    Social History     Socioeconomic History    Marital status:    Tobacco Use    Smoking status: Never    Smokeless tobacco: Never   Substance and  Sexual Activity    Alcohol use: Not Currently     Alcohol/week: 1.0 standard drink     Types: 1 Glasses of wine per week       History of present illness:  Patient comes in today for the left tib fib fracture.  She had a fall at home.  She was seen in the emergency room and splinted and sent on for further care      Review of Systems:    Constitution: Negative for chills, fever, and sweats.  Negative for unexplained weight loss.    HENT:  Negative for headaches and blurry vision.    Cardiovascular:Negative for chest pain or irregular heart beat. Negative for hypertension.    Respiratory:  Negative for cough and shortness of breath.    Gastrointestinal: Negative for abdominal pain, heartburn, melena, nausea, and vomitting.    Genitourinary:  Negative bladder incontinence and dysuria.    Musculoskeletal:  See HPI for details.     Neurological: Negative for numbness.    Psychiatric/Behavioral: Negative for depression.  The patient is not nervous/anxious.      Endocrine: Negative for polyuria    Hematologic/Lymphatic: Negative for bleeding problem.  Does not bruise/bleed easily.    Skin: Negative for poor would healing and rash    Objective:      Physical Examination:    Vital Signs:    Vitals:    06/20/23 1406   BP: 130/70       Body mass index is 26.76 kg/m².    This a well-developed, well nourished patient in no acute distress.  They are alert and oriented and cooperative to examination.        Patient is tender over the lateral malleolus.  She has no tenderness over the medial malleolus.  She has no fractures blisters.  Pertinent New Results:    XRAY Report / Interpretation:   Three views of the left ankle demonstrate a displaced fibular fracture with an intact mortise.    Assessment/Plan:      Lateral malleolar fracture with an intact mortise.  Will treat this conservatively without surgical intervention.  Follow her very closely to be sure this does not displaced.  We will see her back in 2 weeks for new  x-rays      This note was created using Dragon voice recognition software that occasionally misinterpreted phrases or words.

## 2023-07-11 ENCOUNTER — OFFICE VISIT (OUTPATIENT)
Dept: ORTHOPEDICS | Facility: CLINIC | Age: 83
End: 2023-07-11
Payer: MEDICARE

## 2023-07-11 VITALS — WEIGHT: 137 LBS | BODY MASS INDEX: 26.9 KG/M2 | HEIGHT: 60 IN

## 2023-07-11 DIAGNOSIS — S82.832D OTHER CLOSED FRACTURE OF DISTAL END OF LEFT FIBULA WITH ROUTINE HEALING, SUBSEQUENT ENCOUNTER: Primary | ICD-10-CM

## 2023-07-11 PROCEDURE — 99024 POSTOP FOLLOW-UP VISIT: CPT | Mod: S$GLB,POP,, | Performed by: ORTHOPAEDIC SURGERY

## 2023-07-11 PROCEDURE — 99024 PR POST-OP FOLLOW-UP VISIT: ICD-10-PCS | Mod: S$GLB,POP,, | Performed by: ORTHOPAEDIC SURGERY

## 2023-07-11 NOTE — PROGRESS NOTES
[facility]    Subjective:     Chief Complaint:   Chief Complaint   Patient presents with    Left Lower Leg - Injury     Fracture Care. Left Distal Fibula Fx. States she is doing very well       Past Medical History:   Diagnosis Date    Anxiety     Depression     Diverticulitis     GERD (gastroesophageal reflux disease)     Hyperlipidemia     Hypertension     Ovarian cancer 2002    TIA (transient ischemic attack)        Past Surgical History:   Procedure Laterality Date    HERNIA REPAIR      HYSTERECTOMY         Current Outpatient Medications   Medication Sig    clonazePAM (KLONOPIN) 0.5 MG tablet Take 0.5 mg by mouth once as needed for Anxiety.    clonazePAM (KLONOPIN) 0.5 MG tablet Take 1 tablet (0.5 mg total) by mouth 2 (two) times daily as needed for Anxiety.    clopidogreL (PLAVIX) 75 mg tablet Take 1 tablet (75 mg total) by mouth once daily.    clotrimazole-betamethasone 1-0.05% (LOTRISONE) cream Apply topically 2 (two) times daily.    doxycycline (VIBRA-TABS) 100 MG tablet Take 1 tablet (100 mg total) by mouth 2 (two) times daily.    levothyroxine (SYNTHROID) 50 MCG tablet Take 1 tablet (50 mcg total) by mouth before breakfast.    pantoprazole (PROTONIX) 40 MG tablet Take 1 tablet (40 mg total) by mouth once daily.    rosuvastatin (CRESTOR) 20 MG tablet Take 1 tablet (20 mg total) by mouth once daily.    sertraline (ZOLOFT) 50 MG tablet Take 1 tablet (50 mg total) by mouth once daily.    triamcinolone acetonide 0.1% (KENALOG) 0.1 % cream Apply topically 2 (two) times daily.     No current facility-administered medications for this visit.       Review of patient's allergies indicates:   Allergen Reactions    Flagyl [metronidazole hcl]     Morphine     Pcn [penicillins]     Sulfa (sulfonamide antibiotics)        History reviewed. No pertinent family history.    Social History     Socioeconomic History    Marital status:    Tobacco Use    Smoking status: Never    Smokeless tobacco: Never    Substance and Sexual Activity    Alcohol use: Not Currently     Alcohol/week: 1.0 standard drink     Types: 1 Glasses of wine per week       History of present illness:  Ms. Hart comes in today for follow-up for her left distal fibula fracture.  She had left distal fibula fracture about 3 weeks ago.  She is been treating this conservatively/closed in a tall AFO boot.  She is been nonweightbearing.  She reports she does not wear the boot on a regular basis, about 12 hours a day.  She does not sleep in the boot.    Review of Systems:    Constitution: Negative for chills, fever, and sweats.  Negative for unexplained weight loss.    HENT:  Negative for headaches and blurry vision.    Cardiovascular:Negative for chest pain or irregular heart beat. Negative for hypertension.    Respiratory:  Negative for cough and shortness of breath.    Gastrointestinal: Negative for abdominal pain, heartburn, melena, nausea, and vomitting.    Genitourinary:  Negative bladder incontinence and dysuria.    Musculoskeletal:  See HPI for details.     Neurological: Negative for numbness.    Psychiatric/Behavioral: Negative for depression.  The patient is not nervous/anxious.      Endocrine: Negative for polyuria    Hematologic/Lymphatic: Negative for bleeding problem.  Does not bruise/bleed easily.    Skin: Negative for poor would healing and rash    Objective:      Physical Examination:    Vital Signs:  There were no vitals filed for this visit.    Body mass index is 26.76 kg/m².    This a well-developed, well nourished patient in no acute distress.  They are alert and oriented and cooperative to examination.        Left foot and ankle exam: Skin to left foot and ankle is clean dry and intact.  No erythema or ecchymosis.  No signs or symptoms of infection.  Patient is neurovascularly intact throughout the left lower extremity.  Left calf is soft and nontender.  She can wiggle all toes to the left foot.  Capillary refill is brisk and  "less than 2 seconds to all digits in the left foot.  She has some slight dorsiflexion and plantar flexion of the left ankle but it is somewhat limited secondary to discomfort/pain.    Pertinent New Results:    XRAY Report / Interpretation:   Three views were taken of the left ankle today: AP, lateral, and oblique views.  They reveal a healing left distal fibula fracture with positive callus formation.  No further displacement in fracture fragments has occurred.    Assessment/Plan:      1. Left distal fibula fracture.      She will continue with her tall cam walker boot.  She only has to wear this while awake and up.  She does not have to sleep in the boot.  We will do this with 3 more weeks.  She will remain nonweightbearing for 3 more weeks.  We will see her back in 3 weeks with repeat radiographs.  If she has good callus formation and healing, plan would be to discontinue wear of the boot and begin weight-bearing as tolerated on the left lower extremity.    Go Ledesma, Physician Assistant, served in the capacity as a "scribe" for this patient encounter.  A "face-to-face" encounter occurred with Dr. Dakota Alcantara on this date.  The treatment plan and medical decision-making is outlined above. Patient was seen and examined with a chaperone.        This note was created using Dragon voice recognition software that occasionally misinterpreted phrases or words.        "

## 2023-07-13 ENCOUNTER — TELEPHONE (OUTPATIENT)
Dept: OTOLARYNGOLOGY | Facility: CLINIC | Age: 83
End: 2023-07-13
Payer: MEDICARE

## 2023-07-13 NOTE — TELEPHONE ENCOUNTER
----- Message from Bebe Capone sent at 7/13/2023 12:32 PM CDT -----  Contact: self  Type:  Sooner Appointment Request    Caller is requesting a sooner appointment.  Caller declined first available appointment listed below.  Caller will not accept being placed on the waitlist and is requesting a message be sent to doctor.    Name of Caller:  pt  When is the first available appointment?  N/a  Symptoms:  ear wax removal and hearing test  Best Call Back Number:  [phone]  Additional Information:  Pt would like to schedule an appt for wax removal and hearing test for her and spouse  LUCILLE JONES MRN: [id number]   Thank you

## 2023-07-27 ENCOUNTER — OFFICE VISIT (OUTPATIENT)
Dept: OTOLARYNGOLOGY | Facility: CLINIC | Age: 83
End: 2023-07-27
Payer: MEDICARE

## 2023-07-27 ENCOUNTER — CLINICAL SUPPORT (OUTPATIENT)
Dept: AUDIOLOGY | Facility: CLINIC | Age: 83
End: 2023-07-27
Payer: MEDICARE

## 2023-07-27 VITALS
WEIGHT: 137 LBS | HEIGHT: 60 IN | DIASTOLIC BLOOD PRESSURE: 67 MMHG | SYSTOLIC BLOOD PRESSURE: 163 MMHG | BODY MASS INDEX: 26.9 KG/M2 | HEART RATE: 66 BPM | RESPIRATION RATE: 17 BRPM

## 2023-07-27 DIAGNOSIS — H90.3 SENSORINEURAL HEARING LOSS, BILATERAL: Primary | ICD-10-CM

## 2023-07-27 DIAGNOSIS — H90.3 SENSORINEURAL HEARING LOSS (SNHL) OF BOTH EARS: ICD-10-CM

## 2023-07-27 DIAGNOSIS — R21 RASH: ICD-10-CM

## 2023-07-27 DIAGNOSIS — H61.23 BILATERAL IMPACTED CERUMEN: Primary | ICD-10-CM

## 2023-07-27 PROCEDURE — 92557 COMPREHENSIVE HEARING TEST: CPT | Mod: PBBFAC,PO | Performed by: AUDIOLOGIST

## 2023-07-27 PROCEDURE — 69210 REMOVE IMPACTED EAR WAX UNI: CPT | Mod: S$PBB,,, | Performed by: PHYSICIAN ASSISTANT

## 2023-07-27 PROCEDURE — 99213 OFFICE O/P EST LOW 20 MIN: CPT | Mod: PBBFAC,PO | Performed by: PHYSICIAN ASSISTANT

## 2023-07-27 PROCEDURE — 99999 PR PBB SHADOW E&M-EST. PATIENT-LVL III: CPT | Mod: PBBFAC,,, | Performed by: PHYSICIAN ASSISTANT

## 2023-07-27 PROCEDURE — 69210 REMOVE IMPACTED EAR WAX UNI: CPT | Mod: PBBFAC,PO | Performed by: PHYSICIAN ASSISTANT

## 2023-07-27 PROCEDURE — 69210 EAR CERUMEN REMOVAL: ICD-10-PCS | Mod: S$PBB,,, | Performed by: PHYSICIAN ASSISTANT

## 2023-07-27 PROCEDURE — 99203 PR OFFICE/OUTPT VISIT, NEW, LEVL III, 30-44 MIN: ICD-10-PCS | Mod: 25,S$PBB,, | Performed by: PHYSICIAN ASSISTANT

## 2023-07-27 PROCEDURE — 99999 PR PBB SHADOW E&M-EST. PATIENT-LVL III: ICD-10-PCS | Mod: PBBFAC,,, | Performed by: PHYSICIAN ASSISTANT

## 2023-07-27 PROCEDURE — 99203 OFFICE O/P NEW LOW 30 MIN: CPT | Mod: 25,S$PBB,, | Performed by: PHYSICIAN ASSISTANT

## 2023-07-27 PROCEDURE — 92567 TYMPANOMETRY: CPT | Mod: PBBFAC,PO | Performed by: AUDIOLOGIST

## 2023-07-27 RX ORDER — TRIAMCINOLONE ACETONIDE 1 MG/G
CREAM TOPICAL 2 TIMES DAILY
Qty: 45 G | Refills: 5 | Status: SHIPPED | OUTPATIENT
Start: 2023-07-27

## 2023-07-27 NOTE — PROGRESS NOTES
Hailey Mariano was seen 07/27/2023 for an audiological evaluation. Pt was alone during today's visit. Pertinent complaints today include hearing loss. Pt denies history of loud noise exposure and denies early onset of genetic family history of hearing loss. Otoscopy revealed no cerumen in both ears. The tympanic membrane was visualized AU prior to proceeding with the hearing testing.     Results reveal a mild-to-moderate sensorineural hearing loss for the right ear and  mild-to-moderate sensorineural hearing loss for the left ear.    Speech Reception Thresholds were  30 dBHL for the right ear and 35 dBHL for the left ear.    Word recognition scores were excellent for the right ear and excellent for the left ear.   Tympanograms were Type A for the right ear and Type A for the left ear.    Recommendations: 1) Follow up with ENT     2) Annual hearing evaluation     3) Hearing aid consult when ready     4) Wear hearing protection when around noise    Audiogram results were reviewed in detail with patient and all questions were answered. Results will be reviewed by the referring provider at the completion of this note. All complaints were addressed during this visit to the patient's satisfaction.

## 2023-07-27 NOTE — PROGRESS NOTES
[facility]    Subjective:      Patient: Hailey Mariano Patient PCP: Linnette Ghotra NP         :  1940     Sex:  female      MRN:  [id number]          Date of Visit: 2023      Chief Complaint: Cerumen Impaction    Patient ID: Hailey Mariano is a 83 y.o. female who presents to clinic for ear cleaning and audiogram. Pt has prior audiogram noting hearing loss. Pt states that she occasionally has issues hearing people in crowded rooms. Pt states she does not have significant issues with hearing. Pt denies ear pain/discharge, tinnitus, aural fullness or vertigo.     Pt previously prescribed steroid cream by ENT Dr. Riuz to use BID to external ears as needed for ear dryness.     Past Medical History  She has a past medical history of Anxiety, Depression, Diverticulitis, GERD (gastroesophageal reflux disease), Hyperlipidemia, Hypertension, Ovarian cancer, and TIA (transient ischemic attack).    Family History  Her family history is not on file.    Past Surgical History:   Procedure Laterality Date    HERNIA REPAIR      HYSTERECTOMY       Social History     Tobacco Use    Smoking status: Never    Smokeless tobacco: Never   Substance and Sexual Activity    Alcohol use: Not Currently     Alcohol/week: 1.0 standard drink     Types: 1 Glasses of wine per week    Drug use: Not on file    Sexual activity: Not on file     Medications  She has a current medication list which includes the following prescription(s): clonazepam, clopidogrel, clotrimazole-betamethasone 1-0.05%, doxycycline, levothyroxine, pantoprazole, rosuvastatin, sertraline, clonazepam, and triamcinolone acetonide 0.1%.    Review of patient's allergies indicates:   Allergen Reactions    Flagyl [metronidazole hcl]     Morphine     Pcn [penicillins]     Sulfa (sulfonamide antibiotics)      All medications, allergies, and past history have been reviewed.    Objective:      Vitals:  Vitals - 1 value per visit 2023   SYSTOLIC - -  163   DIASTOLIC - - 67   Pulse - - 66   Temp - - -   Resp - - 17   SPO2 - - -   Weight (lb) - 137 137   Weight (kg) - 62.143 62.143   Height - 60 60   BMI (Calculated) - 26.8 26.8   VISIT REPORT - - -   Pain Score  0 - -       Body surface area is 1.62 meters squared.    Physical Exam  Constitutional:       General: She is not in acute distress.     Appearance: Normal appearance. She is not ill-appearing.   HENT:      Head: Normocephalic and atraumatic.      Right Ear: Tympanic membrane, ear canal and external ear normal. There is impacted cerumen.      Left Ear: Tympanic membrane, ear canal and external ear normal. There is impacted cerumen.      Nose: Nose normal.      Mouth/Throat:      Lips: Pink. No lesions.      Mouth: Mucous membranes are moist. No oral lesions.      Tongue: No lesions.      Palate: No lesions.      Pharynx: Oropharynx is clear. Uvula midline. No pharyngeal swelling, oropharyngeal exudate, posterior oropharyngeal erythema or uvula swelling.   Eyes:      General:         Right eye: No discharge.         Left eye: No discharge.      Extraocular Movements: Extraocular movements intact.      Conjunctiva/sclera: Conjunctivae normal.   Pulmonary:      Effort: Pulmonary effort is normal.   Neurological:      General: No focal deficit present.      Mental Status: She is alert and oriented to person, place, and time. Mental status is at baseline.   Psychiatric:         Mood and Affect: Mood normal.         Behavior: Behavior normal.         Thought Content: Thought content normal.         Judgment: Judgment normal.     Ear Cerumen Removal     Date/Time: 7/27/2023 1:30 PM  Performed by: Fei Sood PA-C  Authorized by: Fei Sood PA-C      Consent Done?:  Yes (Verbal)     Local anesthetic:  None  Location details:  Both ears  Procedure type: curette    Cerumen  Removal Results:  Cerumen completely removed  Patient tolerance:  Patient tolerated the procedure well with no immediate  complications    Labs:  WBC   Date Value Ref Range Status   06/14/2022 7.5 3.8 - 10.8 Thousand/uL Final     Platelets   Date Value Ref Range Status   06/14/2022 220 140 - 400 Thousand/uL Final     Creatinine   Date Value Ref Range Status   12/13/2022 1.08 (H) 0.60 - 0.95 mg/dL Final     Glucose   Date Value Ref Range Status   12/13/2022 90 65 - 139 mg/dL Final     Comment:               Non-fasting reference interval              All lab results and imaging results have been reviewed.    Assessment:        ICD-10-CM ICD-9-CM   1. Bilateral impacted cerumen  H61.23 380.4   2. Rash  R21 782.1   3. Sensorineural hearing loss (SNHL) of both ears  H90.3 389.18              Plan:      Bilateral impacted cerumen  -     Ear Cerumen Removal: bilateral ear cleaning performed today in office.     Rash  -     Pt would like refill on cream for episodic ear rash that she develops. triamcinolone acetonide 0.1% (KENALOG) 0.1 % cream; Apply topically 2 (two) times daily.  Dispense: 45 g; Refill: 5. Use as needed for outer ear rash.     Sensorineural hearing loss (SNHL) of both ears  -Audiogram completed today with pt revealing bilateral mild to moderate SNHL. Pt will hold on hearing aids at this time. We will monitor hearing with annual audiograms and pt may follow up sooner if having ENT issues/concerns.

## 2023-07-27 NOTE — PROCEDURES
Ear Cerumen Removal    Date/Time: 7/27/2023 1:30 PM  Performed by: Fei Sood PA-C  Authorized by: Fei Sood PA-C     Consent Done?:  Yes (Verbal)    Local anesthetic:  None  Location details:  Both ears  Procedure type: curette    Cerumen  Removal Results:  Cerumen completely removed  Patient tolerance:  Patient tolerated the procedure well with no immediate complications

## 2023-08-01 ENCOUNTER — OFFICE VISIT (OUTPATIENT)
Dept: ORTHOPEDICS | Facility: CLINIC | Age: 83
End: 2023-08-01
Payer: MEDICARE

## 2023-08-01 VITALS — WEIGHT: 137 LBS | BODY MASS INDEX: 26.9 KG/M2 | HEIGHT: 60 IN

## 2023-08-01 DIAGNOSIS — M75.41 IMPINGEMENT SYNDROME OF RIGHT SHOULDER: ICD-10-CM

## 2023-08-01 DIAGNOSIS — S82.832D OTHER CLOSED FRACTURE OF DISTAL END OF LEFT FIBULA WITH ROUTINE HEALING, SUBSEQUENT ENCOUNTER: Primary | ICD-10-CM

## 2023-08-01 PROCEDURE — 99024 POSTOP FOLLOW-UP VISIT: CPT | Mod: S$GLB,POP,, | Performed by: ORTHOPAEDIC SURGERY

## 2023-08-01 PROCEDURE — 99024 PR POST-OP FOLLOW-UP VISIT: ICD-10-PCS | Mod: S$GLB,POP,, | Performed by: ORTHOPAEDIC SURGERY

## 2023-08-01 NOTE — PROGRESS NOTES
[facility]    Subjective:     Chief Complaint:   Chief Complaint   Patient presents with    Left Ankle - Pain     FRACTURE CARE, left distal fibula fracture, has no pain,     Right Shoulder - Pain     When she fell she does not recall hitting the right shoulder, she has shoulder pain occasionally, limited and painful ROM       Past Medical History:   Diagnosis Date    Anxiety     Depression     Diverticulitis     GERD (gastroesophageal reflux disease)     Hyperlipidemia     Hypertension     Ovarian cancer 2002    TIA (transient ischemic attack)        Past Surgical History:   Procedure Laterality Date    HERNIA REPAIR      HYSTERECTOMY         Current Outpatient Medications   Medication Sig    clonazePAM (KLONOPIN) 0.5 MG tablet Take 0.5 mg by mouth once as needed for Anxiety.    clonazePAM (KLONOPIN) 0.5 MG tablet Take 1 tablet (0.5 mg total) by mouth 2 (two) times daily as needed for Anxiety.    clopidogreL (PLAVIX) 75 mg tablet Take 1 tablet (75 mg total) by mouth once daily.    clotrimazole-betamethasone 1-0.05% (LOTRISONE) cream Apply topically 2 (two) times daily.    doxycycline (VIBRA-TABS) 100 MG tablet Take 1 tablet (100 mg total) by mouth 2 (two) times daily.    levothyroxine (SYNTHROID) 50 MCG tablet Take 1 tablet (50 mcg total) by mouth before breakfast.    pantoprazole (PROTONIX) 40 MG tablet Take 1 tablet (40 mg total) by mouth once daily.    rosuvastatin (CRESTOR) 20 MG tablet Take 1 tablet (20 mg total) by mouth once daily.    sertraline (ZOLOFT) 50 MG tablet Take 1 tablet (50 mg total) by mouth once daily.    triamcinolone acetonide 0.1% (KENALOG) 0.1 % cream Apply topically 2 (two) times daily.     No current facility-administered medications for this visit.       Review of patient's allergies indicates:   Allergen Reactions    Flagyl [metronidazole hcl]     Morphine     Pcn [penicillins]     Sulfa (sulfonamide antibiotics)        No family history on file.    Social History      Socioeconomic History    Marital status:    Tobacco Use    Smoking status: Never    Smokeless tobacco: Never   Substance and Sexual Activity    Alcohol use: Not Currently     Alcohol/week: 1.0 standard drink of alcohol     Types: 1 Glasses of wine per week       History of present illness:  Patient comes in today for follow-up for left distal fibula fracture.  She is about 6 weeks out from her initial injury.  She has been using her cam walker boot.  She also complains of some right shoulder discomfort.  She would like to have this evaluated today also.  He denies any pain at the left ankle.    Review of Systems:    Constitution: Negative for chills, fever, and sweats.  Negative for unexplained weight loss.    HENT:  Negative for headaches and blurry vision.    Cardiovascular:Negative for chest pain or irregular heart beat. Negative for hypertension.    Respiratory:  Negative for cough and shortness of breath.    Gastrointestinal: Negative for abdominal pain, heartburn, melena, nausea, and vomitting.    Genitourinary:  Negative bladder incontinence and dysuria.    Musculoskeletal:  See HPI for details.     Neurological: Negative for numbness.    Psychiatric/Behavioral: Negative for depression.  The patient is not nervous/anxious.      Endocrine: Negative for polyuria    Hematologic/Lymphatic: Negative for bleeding problem.  Does not bruise/bleed easily.    Skin: Negative for poor would healing and rash    Objective:      Physical Examination:    Vital Signs:  There were no vitals filed for this visit.    Body mass index is 26.76 kg/m².    This a well-developed, well nourished patient in no acute distress.  They are alert and oriented and cooperative to examination.        Left ankle exam: Skin throughout the left ankle and foot is clean dry and intact.  There is no erythema or ecchymosis.  There are no signs or symptoms of infection.  She is neurovascularly intact throughout the left lower extremity.  She  can wiggle all toes of the left foot.  Left EHL is intact.  Left calf is soft and nontender.  She can dorsiflex and plantar flex at the left ankle without difficulty.  No tenderness to palpation about the distal fibula on the left.      Right shoulder exam: Skin throughout the right shoulder is clean dry and intact.  There is no erythema or ecchymosis.  There are no signs or symptoms of infection.  She is neurovascularly intact throughout the right upper extremity.  She has full active range of motion of the right shoulder forward flexion, external rotation, internal rotation, and abduction.  Her right rotator cuff tendon is grossly intact resisted muscle testing but mildly weak.  It is not tender to resisted external and internal rotation maneuvers.  She does have a positive Anders test.  Mildly positive Neer impingement sign.  Nontender over the right AC joint and negative crossover.      Pertinent New Results:    XRAY Report / Interpretation:   Three views were taken of the left ankle today: AP, lateral, and oblique views.  They reveal a healing left distal fibula fracture with positive callus formation.  No further fracture displacement has occurred.  Visualized soft tissues appear unremarkable.      Two views were taken of the right shoulder today:  AP and Y-views.  They reveal no acute fractures or dislocations.  Visualized soft tissues appear unremarkable.    Assessment/Plan:      1. Status post left distal fibula fracture, subsequent encounter.  2. Right shoulder impingement syndrome.      She can discontinue the tall cam walker boot on the left lower extremity.  She can begin weight-bearing as tolerated in resuming her regular activities of daily living.  Did explain to her she will likely spend some level stiffness and discomfort as she resumes activity.  She should slowly progress her activity and use any over-the-counter NSAID of her choosing for any discomfort.  This should slowly resolve.  For her  "right shoulder, no particular intervention is warranted.  I did offer a subacromial corticosteroid injection and she politely declined.  She can follow up with us on an as-needed basis for her right shoulder or for any other orthopedic issues or concerns that arise.    Go Ledesma, Physician Assistant, served in the capacity as a "scribe" for this patient encounter.  A "face-to-face" encounter occurred with Dr. Dakota Alcantara on this date.  The treatment plan and medical decision-making is outlined above. Patient was seen and examined with a chaperone.       This note was created using Dragon voice recognition software that occasionally misinterpreted phrases or words.          "

## 2023-08-15 ENCOUNTER — OFFICE VISIT (OUTPATIENT)
Dept: ORTHOPEDICS | Facility: CLINIC | Age: 83
End: 2023-08-15
Payer: MEDICARE

## 2023-08-15 VITALS
SYSTOLIC BLOOD PRESSURE: 140 MMHG | DIASTOLIC BLOOD PRESSURE: 82 MMHG | BODY MASS INDEX: 26.9 KG/M2 | WEIGHT: 137 LBS | HEIGHT: 60 IN

## 2023-08-15 DIAGNOSIS — S82.832D OTHER CLOSED FRACTURE OF DISTAL END OF LEFT FIBULA WITH ROUTINE HEALING, SUBSEQUENT ENCOUNTER: ICD-10-CM

## 2023-08-15 DIAGNOSIS — M65.331 TRIGGER FINGER, RIGHT MIDDLE FINGER: Primary | ICD-10-CM

## 2023-08-15 PROCEDURE — 99213 OFFICE O/P EST LOW 20 MIN: CPT | Mod: 24,25,S$GLB, | Performed by: ORTHOPAEDIC SURGERY

## 2023-08-15 PROCEDURE — 20550 TENDON SHEATH: ICD-10-PCS | Mod: 79,RT,S$GLB, | Performed by: ORTHOPAEDIC SURGERY

## 2023-08-15 PROCEDURE — 99024 PR POST-OP FOLLOW-UP VISIT: ICD-10-PCS | Mod: S$GLB,POP,, | Performed by: ORTHOPAEDIC SURGERY

## 2023-08-15 PROCEDURE — 99024 POSTOP FOLLOW-UP VISIT: CPT | Mod: S$GLB,POP,, | Performed by: ORTHOPAEDIC SURGERY

## 2023-08-15 PROCEDURE — 20550 NJX 1 TENDON SHEATH/LIGAMENT: CPT | Mod: 79,RT,S$GLB, | Performed by: ORTHOPAEDIC SURGERY

## 2023-08-15 PROCEDURE — 99213 PR OFFICE/OUTPT VISIT, EST, LEVL III, 20-29 MIN: ICD-10-PCS | Mod: 24,25,S$GLB, | Performed by: ORTHOPAEDIC SURGERY

## 2023-08-15 RX ADMIN — TRIAMCINOLONE ACETONIDE 40 MG: 40 INJECTION, SUSPENSION INTRA-ARTICULAR; INTRAMUSCULAR at 08:08

## 2023-08-15 NOTE — PROGRESS NOTES
[facility]    Subjective:     Chief Complaint:   Chief Complaint   Patient presents with    Left Ankle - Pain     LT ankle pain, hx of fibula fx. Pain radiates UP from ankle, went into hip. Denies back pain. Doesn't have pain when laying down, constant with walking. Had an xray 2 weeks ago, this pain started after that visit       Past Medical History:   Diagnosis Date    Anxiety     Depression     Diverticulitis     GERD (gastroesophageal reflux disease)     Hyperlipidemia     Hypertension     Ovarian cancer 2002    TIA (transient ischemic attack)        Past Surgical History:   Procedure Laterality Date    HERNIA REPAIR      HYSTERECTOMY         Current Outpatient Medications   Medication Sig    clonazePAM (KLONOPIN) 0.5 MG tablet Take 0.5 mg by mouth once as needed for Anxiety.    clonazePAM (KLONOPIN) 0.5 MG tablet Take 1 tablet (0.5 mg total) by mouth 2 (two) times daily as needed for Anxiety.    clopidogreL (PLAVIX) 75 mg tablet Take 1 tablet (75 mg total) by mouth once daily.    clotrimazole-betamethasone 1-0.05% (LOTRISONE) cream Apply topically 2 (two) times daily.    doxycycline (VIBRA-TABS) 100 MG tablet Take 1 tablet (100 mg total) by mouth 2 (two) times daily.    levothyroxine (SYNTHROID) 50 MCG tablet Take 1 tablet (50 mcg total) by mouth before breakfast.    pantoprazole (PROTONIX) 40 MG tablet Take 1 tablet (40 mg total) by mouth once daily.    rosuvastatin (CRESTOR) 20 MG tablet Take 1 tablet (20 mg total) by mouth once daily.    sertraline (ZOLOFT) 50 MG tablet Take 1 tablet (50 mg total) by mouth once daily.    triamcinolone acetonide 0.1% (KENALOG) 0.1 % cream Apply topically 2 (two) times daily.     No current facility-administered medications for this visit.       Review of patient's allergies indicates:   Allergen Reactions    Flagyl [metronidazole hcl]     Morphine     Pcn [penicillins]     Sulfa (sulfonamide antibiotics)        No family history on file.    Social History      Socioeconomic History    Marital status:    Tobacco Use    Smoking status: Never    Smokeless tobacco: Never   Substance and Sexual Activity    Alcohol use: Not Currently     Alcohol/week: 1.0 standard drink of alcohol     Types: 1 Glasses of wine per week       History of present illness:  83-year-old female, returns to clinic today for multiple complaints.    We have been seeing and treating her for a left distal fibular fracture.  Last office visit was August 1st.  We took her out of her boot orthosis, she has been weight-bearing as tolerated.  She states that after prolonged periods of standing, she will have some pain in the ankle.  It is only with weight-bearing, she has no pain when she lies down.  She had a real busy day the other day with her 's birthday party and this really exacerbated her discomfort she is here to be re-evaluated.    She has a new complaint of right hand pain, this is some triggering of the right middle finger.  It locks or catches in the morning she has to physically reduce it.      Review of Systems:    Constitution: Negative for chills, fever, and sweats.  Negative for unexplained weight loss.    HENT:  Negative for headaches and blurry vision.    Cardiovascular:Negative for chest pain or irregular heart beat. Negative for hypertension.    Respiratory:  Negative for cough and shortness of breath.    Gastrointestinal: Negative for abdominal pain, heartburn, melena, nausea, and vomitting.    Genitourinary:  Negative bladder incontinence and dysuria.    Musculoskeletal:  See HPI for details.     Neurological: Negative for numbness.    Psychiatric/Behavioral: Negative for depression.  The patient is not nervous/anxious.      Endocrine: Negative for polyuria    Hematologic/Lymphatic: Negative for bleeding problem.  Does not bruise/bleed easily.    Skin: Negative for poor would healing and rash    Objective:      Physical Examination:    Vital Signs:    Vitals:    08/15/23  "0810   BP: (!) 140/82       Body mass index is 26.76 kg/m².    This a well-developed, well nourished patient in no acute distress.  They are alert and oriented and cooperative to examination.        Examination of the right hand, skin is dry and intact, no erythema or ecchymosis, no signs symptoms of infection.  She has some joint abnormalities at the interphalangeal joints consistent with osteoarthritis.  She has visible and palpable triggering of the right long finger.  Not exquisitely tender painful however.    Examination of the left ankle, there is some hypertrophy of the left ankle joint.  No palpable effusion is noted.  She is tender over the distal fibula.  She also complains some tenderness over the medial malleolus on exam.  Skin is dry and intact comminuted or ecchymosis no signs symptoms of infection.  No pain in the midfoot.  No pain in the knee, normal range of motion at left knee.  Straight leg raise negative.  Calf soft nontender.    Pertinent New Results:    XRAY Report / Interpretation:   AP lateral oblique views of the right hand taken today in the office demonstrate some osteoarthritic changes in the interphalangeal joints diffusely.  Visualized soft tissues appear normal.    AP lateral oblique views of the left ankle taken today in the office demonstrate a healing distal fibular fracture with good callus formation.  Acceptable bony alignment.    Assessment/Plan:      Trigger finger right long finger, we injected the right long finger today with lidocaine and Kenalog  We discussed trigger finger release if she does not respond to the injection.    History of left fibular fracture, routine healing.  She came out of her AFO 2 weeks ago.  Will put her back into an ankle corset the provider a little more support.  She will wear this for another 4-6 weeks.  X-rays look fine.    Giovani Banda, Physician Assistant, served in the capacity as a "scribe" for this patient encounter.  A "face-to-face" " encounter occurred with Dr. Dakota Alcantara on this date.  The treatment plan and medical decision-making is outlined above. Patient was seen and examined with a chaperone.       This note was created using Dragon voice recognition software that occasionally misinterpreted phrases or words.

## 2023-08-16 RX ORDER — TRIAMCINOLONE ACETONIDE 40 MG/ML
40 INJECTION, SUSPENSION INTRA-ARTICULAR; INTRAMUSCULAR
Status: DISCONTINUED | OUTPATIENT
Start: 2023-08-15 | End: 2023-08-16 | Stop reason: HOSPADM

## 2023-08-16 NOTE — PROCEDURES
Tendon Sheath    Date/Time: 8/15/2023 8:00 AM    Performed by: Dakota Alcantara MD  Authorized by: Dakota Alcantara MD    Consent Done?:  Yes (Verbal)  Site marked: the procedure site was marked    Timeout: prior to procedure the correct patient, procedure, and site was verified    Prep: patient was prepped and draped in usual sterile fashion      Local anesthesia used?: Yes    Local anesthetic:  Lidocaine 1% without epinephrine  Location:  Long finger  Site:  R long flexor tendon sheath  Ultrasonic guidance for needle placement?: No    Needle size:  25 G  Medications:  40 mg triamcinolone acetonide 40 mg/mL  Patient tolerance:  Patient tolerated the procedure well with no immediate complications

## 2023-08-21 DIAGNOSIS — F32.A DEPRESSION, UNSPECIFIED DEPRESSION TYPE: ICD-10-CM

## 2023-08-21 DIAGNOSIS — F41.9 ANXIETY: ICD-10-CM

## 2023-08-21 DIAGNOSIS — Z79.899 HIGH RISK MEDICATION USE: ICD-10-CM

## 2023-08-21 RX ORDER — SERTRALINE HYDROCHLORIDE 50 MG/1
50 TABLET, FILM COATED ORAL DAILY
Qty: 90 TABLET | Refills: 1 | Status: SHIPPED | OUTPATIENT
Start: 2023-08-21 | End: 2023-09-06 | Stop reason: SDUPTHER

## 2023-08-21 RX ORDER — CLOPIDOGREL BISULFATE 75 MG/1
75 TABLET ORAL DAILY
Qty: 90 TABLET | Refills: 1 | Status: SHIPPED | OUTPATIENT
Start: 2023-08-21 | End: 2023-09-06 | Stop reason: SDUPTHER

## 2023-08-21 NOTE — TELEPHONE ENCOUNTER
----- Message from Coty Rodriguez sent at 8/21/2023  3:05 PM CDT -----  Vm- 2:34-pt needs refill on clopidogrel 75 mg, sertraline 50 mg   [facility]   [phone]

## 2023-09-06 DIAGNOSIS — Z79.899 HIGH RISK MEDICATION USE: ICD-10-CM

## 2023-09-06 DIAGNOSIS — F32.A DEPRESSION, UNSPECIFIED DEPRESSION TYPE: ICD-10-CM

## 2023-09-06 DIAGNOSIS — F41.9 ANXIETY: ICD-10-CM

## 2023-09-06 RX ORDER — CLOPIDOGREL BISULFATE 75 MG/1
75 TABLET ORAL DAILY
Qty: 90 TABLET | Refills: 1 | Status: SHIPPED | OUTPATIENT
Start: 2023-09-06 | End: 2024-02-22 | Stop reason: SDUPTHER

## 2023-09-06 RX ORDER — SERTRALINE HYDROCHLORIDE 50 MG/1
50 TABLET, FILM COATED ORAL DAILY
Qty: 90 TABLET | Refills: 1 | Status: SHIPPED | OUTPATIENT
Start: 2023-09-06 | End: 2024-02-22 | Stop reason: SDUPTHER

## 2023-09-06 NOTE — TELEPHONE ENCOUNTER
----- Message from Coty Rodriguez sent at 9/6/2023  3:13 PM CDT -----  Vm- 2:28-pt is calling to get refills but does not [facility] to [facility]. [facility]   Sertraline 50 mg   Clopidogrel 75 mg   [phone]

## 2023-09-20 DIAGNOSIS — Z78.0 ASYMPTOMATIC MENOPAUSAL STATE: ICD-10-CM

## 2023-09-21 ENCOUNTER — TELEPHONE (OUTPATIENT)
Dept: FAMILY MEDICINE | Facility: CLINIC | Age: 83
End: 2023-09-21

## 2023-09-21 NOTE — TELEPHONE ENCOUNTER
----- Message from Miley Gonzales sent at 9/21/2023  3:25 PM CDT -----  Patient called and stated that she need to come in on October 4th for a check up please give her a call at [phone]

## 2023-10-04 ENCOUNTER — OFFICE VISIT (OUTPATIENT)
Dept: FAMILY MEDICINE | Facility: CLINIC | Age: 83
End: 2023-10-04
Payer: MEDICARE

## 2023-10-04 ENCOUNTER — TELEPHONE (OUTPATIENT)
Dept: FAMILY MEDICINE | Facility: CLINIC | Age: 83
End: 2023-10-04

## 2023-10-04 DIAGNOSIS — I10 HYPERTENSION, UNSPECIFIED TYPE: ICD-10-CM

## 2023-10-04 DIAGNOSIS — E78.5 HYPERLIPIDEMIA, UNSPECIFIED HYPERLIPIDEMIA TYPE: ICD-10-CM

## 2023-10-04 DIAGNOSIS — E16.2 HYPOGLYCEMIA: Primary | ICD-10-CM

## 2023-10-04 DIAGNOSIS — N18.31 STAGE 3A CHRONIC KIDNEY DISEASE: ICD-10-CM

## 2023-10-04 DIAGNOSIS — Z79.899 HIGH RISK MEDICATION USE: Primary | ICD-10-CM

## 2023-10-04 DIAGNOSIS — G47.00 INSOMNIA, UNSPECIFIED TYPE: ICD-10-CM

## 2023-10-04 DIAGNOSIS — E87.6 HYPOKALEMIA: ICD-10-CM

## 2023-10-04 DIAGNOSIS — K21.9 GASTROESOPHAGEAL REFLUX DISEASE, UNSPECIFIED WHETHER ESOPHAGITIS PRESENT: ICD-10-CM

## 2023-10-04 DIAGNOSIS — F32.A DEPRESSION, UNSPECIFIED DEPRESSION TYPE: ICD-10-CM

## 2023-10-04 DIAGNOSIS — E03.9 HYPOTHYROIDISM, UNSPECIFIED TYPE: ICD-10-CM

## 2023-10-04 PROCEDURE — 83036 POCT HEMOGLOBIN A1C: ICD-10-PCS | Mod: QW,,, | Performed by: NURSE PRACTITIONER

## 2023-10-04 PROCEDURE — 99214 PR OFFICE/OUTPT VISIT, EST, LEVL IV, 30-39 MIN: ICD-10-PCS | Mod: S$GLB,,, | Performed by: NURSE PRACTITIONER

## 2023-10-04 PROCEDURE — 83036 HEMOGLOBIN GLYCOSYLATED A1C: CPT | Mod: QW,,, | Performed by: NURSE PRACTITIONER

## 2023-10-04 PROCEDURE — 99214 OFFICE O/P EST MOD 30 MIN: CPT | Mod: S$GLB,,, | Performed by: NURSE PRACTITIONER

## 2023-10-04 NOTE — TELEPHONE ENCOUNTER
----- Message from Miley Gonzales sent at 10/4/2023 10:10 AM CDT -----  Patient   Babak called and stated that the patient is still at the eye doctor and will not be able to make her appointment for today so he would like to reschedule her for next week. Please give her a call at [phone]

## 2023-10-04 NOTE — PROGRESS NOTES
SUBJECTIVE:    Patient ID: Hailey Mariano is a 83 y.o. female.    Chief Complaint: Follow-up (No bottles//pt is here for check up and medication refills//pt c/o of feeling of weakness like she's going to pass out, also c/o of feeling shaky when she feels like this x 1 week//pt declines pcv vaccine//pt declines dexa scan-SC)    83-year-old female presents for urgent visit.  Treated for htn hyperlipidemia gerd and anxiety.  Taking medication as prescribed. bp in office today is well controlled. Does not check at home. For the past 2 weeks has been experiencing episodes of lightheaded dizziness. After sitting symptoms do seem to improve. No chest pain. No sob. No chest pain. Has not taken anything.  Denies fever or any associating symptoms        Telephone on 10/04/2023   Component Date Value Ref Range Status    WBC 10/04/2023 10.8  3.8 - 10.8 Thousand/uL Final    RBC 10/04/2023 3.95  3.80 - 5.10 Million/uL Final    Hemoglobin 10/04/2023 12.2  11.7 - 15.5 g/dL Final    Hematocrit 10/04/2023 35.7  35.0 - 45.0 % Final    MCV 10/04/2023 90.4  80.0 - 100.0 fL Final    MCH 10/04/2023 30.9  27.0 - 33.0 pg Final    MCHC 10/04/2023 34.2  32.0 - 36.0 g/dL Final    RDW 10/04/2023 13.1  11.0 - 15.0 % Final    Platelets 10/04/2023 210  140 - 400 Thousand/uL Final    MPV 10/04/2023 10.5  7.5 - 12.5 fL Final    Neutrophils, Abs 10/04/2023 6,901  1,500 - 7,800 cells/uL Final    Lymph # 10/04/2023 2,927  850 - 3,900 cells/uL Final    Mono # 10/04/2023 734  200 - 950 cells/uL Final    Eos # 10/04/2023 173  15 - 500 cells/uL Final    Baso # 10/04/2023 65  0 - 200 cells/uL Final    Neutrophils Relative 10/04/2023 63.9  % Final    Lymph % 10/04/2023 27.1  % Final    Mono % 10/04/2023 6.8  % Final    Eosinophil % 10/04/2023 1.6  % Final    Basophil % 10/04/2023 0.6  % Final    Glucose 10/04/2023 59 (L)  65 - 139 mg/dL Final    BUN 10/04/2023 21  7 - 25 mg/dL Final    Creatinine 10/04/2023 1.15 (H)  0.60 - 0.95 mg/dL Final    eGFR  10/04/2023 47 (L)  > OR = 60 mL/min/1.73m2 Final    BUN/Creatinine Ratio 10/04/2023 18  6 - 22 (calc) Final    Sodium 10/04/2023 144  135 - 146 mmol/L Final    Potassium 10/04/2023 3.7  3.5 - 5.3 mmol/L Final    Chloride 10/04/2023 105  98 - 110 mmol/L Final    CO2 10/04/2023 29  20 - 32 mmol/L Final    Calcium 10/04/2023 9.5  8.6 - 10.4 mg/dL Final    Total Protein 10/04/2023 6.9  6.1 - 8.1 g/dL Final    Albumin 10/04/2023 4.5  3.6 - 5.1 g/dL Final    Globulin, Total 10/04/2023 2.4  1.9 - 3.7 g/dL (calc) Final    Albumin/Globulin Ratio 10/04/2023 1.9  1.0 - 2.5 (calc) Final    Total Bilirubin 10/04/2023 0.5  0.2 - 1.2 mg/dL Final    Alkaline Phosphatase 10/04/2023 44  37 - 153 U/L Final    AST 10/04/2023 14  10 - 35 U/L Final    ALT 10/04/2023 9  6 - 29 U/L Final    Cholesterol 10/04/2023 146  <200 mg/dL Final    HDL 10/04/2023 65  > OR = 50 mg/dL Final    Triglycerides 10/04/2023 188 (H)  <150 mg/dL Final    LDL Cholesterol 10/04/2023 55  mg/dL (calc) Final    HDL/Cholesterol Ratio 10/04/2023 2.2  <5.0 (calc) Final    Non HDL Chol. (LDL+VLDL) 10/04/2023 81  <130 mg/dL (calc) Final    Creatinine, Urine 10/04/2023 203  20 - 275 mg/dL Final    Microalb, Ur 10/04/2023 2.0  See Note: mg/dL Final    Microalb/Creat Ratio 10/04/2023 10  <30 mcg/mg creat Final    TSH w/reflex to FT4 10/04/2023 7.82 (H)  0.40 - 4.50 mIU/L Final    T4, Free 10/04/2023 1.0  0.8 - 1.8 ng/dL Final    Color, UA 10/04/2023 YELLOW  YELLOW Final    Appearance, UA 10/04/2023 CLEAR  CLEAR Final    Specific Gravity, UA 10/04/2023 1.023  1.001 - 1.035 Final    pH, UA 10/04/2023 5.5  5.0 - 8.0 Final    Glucose, UA 10/04/2023 NEGATIVE  NEGATIVE Final    Bilirubin, UA 10/04/2023 NEGATIVE  NEGATIVE Final    Ketones, UA 10/04/2023 TRACE (A)  NEGATIVE Final    Occult Blood UA 10/04/2023 NEGATIVE  NEGATIVE Final    Protein, UA 10/04/2023 NEGATIVE  NEGATIVE Final    Nitrite, UA 10/04/2023 NEGATIVE  NEGATIVE Final    Leukocytes, UA 10/04/2023 1+ (A)  NEGATIVE  Final    WBC Casts, UA 10/04/2023 0-5  < OR = 5 /HPF Final    RBC Casts, UA 10/04/2023 NONE SEEN  < OR = 2 /HPF Final    Squam Epithel, UA 10/04/2023 0-5  < OR = 5 /HPF Final    Bacteria, UA 10/04/2023 NONE SEEN  NONE SEEN /HPF Final    Hyaline Casts, UA 10/04/2023 0-5 (A)  NONE SEEN /LPF Final    Service Cmt: 10/04/2023    Final    Reflexive Urine Culture 10/04/2023    Final    Urine Culture, Routine 10/04/2023    Final       Past Medical History:   Diagnosis Date    Anxiety     Depression     Diverticulitis     GERD (gastroesophageal reflux disease)     Hyperlipidemia     Hypertension     Ovarian cancer 2002    TIA (transient ischemic attack)      Social History     Socioeconomic History    Marital status:    Tobacco Use    Smoking status: Never    Smokeless tobacco: Never   Substance and Sexual Activity    Alcohol use: Not Currently     Alcohol/week: 1.0 standard drink of alcohol     Types: 1 Glasses of wine per week     Past Surgical History:   Procedure Laterality Date    HERNIA REPAIR      HYSTERECTOMY       History reviewed. No pertinent family history.    All of your core healthy metrics are met.      Review of patient's allergies indicates:   Allergen Reactions    Flagyl [metronidazole hcl]     Morphine     Pcn [penicillins]     Sulfa (sulfonamide antibiotics)        Current Outpatient Medications:     clonazePAM (KLONOPIN) 0.5 MG tablet, Take 0.5 mg by mouth once as needed for Anxiety., Disp: , Rfl:     clonazePAM (KLONOPIN) 0.5 MG tablet, Take 1 tablet (0.5 mg total) by mouth 2 (two) times daily as needed for Anxiety., Disp: 45 tablet, Rfl: 0    clopidogreL (PLAVIX) 75 mg tablet, Take 1 tablet (75 mg total) by mouth once daily., Disp: 90 tablet, Rfl: 1    clotrimazole-betamethasone 1-0.05% (LOTRISONE) cream, Apply topically 2 (two) times daily., Disp: 45 g, Rfl: 1    levothyroxine (SYNTHROID) 50 MCG tablet, Take 1 tablet (50 mcg total) by mouth before breakfast., Disp: 90 tablet, Rfl: 1     pantoprazole (PROTONIX) 40 MG tablet, Take 1 tablet (40 mg total) by mouth once daily., Disp: 90 tablet, Rfl: 3    rosuvastatin (CRESTOR) 20 MG tablet, Take 1 tablet (20 mg total) by mouth once daily., Disp: 90 tablet, Rfl: 3    sertraline (ZOLOFT) 50 MG tablet, Take 1 tablet (50 mg total) by mouth once daily., Disp: 90 tablet, Rfl: 1    triamcinolone acetonide 0.1% (KENALOG) 0.1 % cream, Apply topically 2 (two) times daily., Disp: 45 g, Rfl: 5    Review of Systems   Constitutional:  Negative for chills, fever and unexpected weight change.   HENT:  Negative for ear pain, rhinorrhea and sore throat.    Eyes:  Negative for pain and visual disturbance.   Respiratory:  Negative for cough and shortness of breath.    Cardiovascular:  Negative for chest pain, palpitations and leg swelling.   Gastrointestinal:  Negative for abdominal pain, diarrhea, nausea and vomiting.   Genitourinary:  Negative for difficulty urinating, hematuria and vaginal bleeding.   Musculoskeletal:  Negative for arthralgias.   Skin:  Negative for rash.   Neurological:  Negative for dizziness, weakness and headaches.   Psychiatric/Behavioral:  Negative for agitation and sleep disturbance. The patient is not nervous/anxious.           Objective:      Vitals:    10/04/23 1119 10/04/23 1141   BP: (!) 130/58 139/68   Pulse: (!) 58    SpO2: 99%    Weight: 59.3 kg (130 lb 12.8 oz)    Height: 5' (1.524 m)      Physical Exam  Vitals and nursing note reviewed.   Constitutional:       Appearance: Normal appearance. She is well-developed.   HENT:      Head: Normocephalic.      Right Ear: External ear normal.      Left Ear: External ear normal.   Eyes:      Conjunctiva/sclera: Conjunctivae normal.      Pupils: Pupils are equal, round, and reactive to light.   Neck:      Vascular: No JVD.   Cardiovascular:      Rate and Rhythm: Normal rate and regular rhythm.      Heart sounds: No murmur heard.  Pulmonary:      Effort: Pulmonary effort is normal.      Breath  sounds: Normal breath sounds.   Abdominal:      General: Bowel sounds are normal.      Palpations: Abdomen is soft.   Musculoskeletal:         General: No deformity. Normal range of motion.      Cervical back: Normal range of motion and neck supple.   Lymphadenopathy:      Cervical: No cervical adenopathy.   Skin:     General: Skin is warm and dry.      Findings: No rash.   Neurological:      Mental Status: She is alert and oriented to person, place, and time.      Gait: Gait normal.   Psychiatric:         Speech: Speech normal.         Behavior: Behavior normal.           Assessment:       1. Hypoglycemia    2. Stage 3a chronic kidney disease    3. Hyperlipidemia, unspecified hyperlipidemia type    4. Gastroesophageal reflux disease, unspecified whether esophagitis present    5. Hypertension, unspecified type    6. Depression, unspecified depression type    7. Insomnia, unspecified type         Plan:       Hypoglycemia  Comments:  small frequent meals  Orders:  -     POCT HEMOGLOBIN A1C    Stage 3a chronic kidney disease    Hyperlipidemia, unspecified hyperlipidemia type  Comments:  crestor    Gastroesophageal reflux disease, unspecified whether esophagitis present  Comments:  protonix    Hypertension, unspecified type  Comments:  bp is well controlled.     Depression, unspecified depression type  Comments:  sertraline    Insomnia, unspecified type      Follow up in about 4 weeks (around 11/1/2023), or if symptoms worsen or fail to improve, for medication management.        11/6/2023 Linnette Ghotra

## 2023-10-06 LAB
ALBUMIN SERPL-MCNC: 4.5 G/DL (ref 3.6–5.1)
ALBUMIN/CREAT UR: 10 MCG/MG CREAT
ALBUMIN/GLOB SERPL: 1.9 (CALC) (ref 1–2.5)
ALP SERPL-CCNC: 44 U/L (ref 37–153)
ALT SERPL-CCNC: 9 U/L (ref 6–29)
APPEARANCE UR: CLEAR
AST SERPL-CCNC: 14 U/L (ref 10–35)
BACTERIA #/AREA URNS HPF: ABNORMAL /HPF
BACTERIA UR CULT: ABNORMAL
BACTERIA UR CULT: ABNORMAL
BASOPHILS # BLD AUTO: 65 CELLS/UL (ref 0–200)
BASOPHILS NFR BLD AUTO: 0.6 %
BILIRUB SERPL-MCNC: 0.5 MG/DL (ref 0.2–1.2)
BILIRUB UR QL STRIP: NEGATIVE
BUN SERPL-MCNC: 21 MG/DL (ref 7–25)
BUN/CREAT SERPL: 18 (CALC) (ref 6–22)
CALCIUM SERPL-MCNC: 9.5 MG/DL (ref 8.6–10.4)
CHLORIDE SERPL-SCNC: 105 MMOL/L (ref 98–110)
CHOLEST SERPL-MCNC: 146 MG/DL
CHOLEST/HDLC SERPL: 2.2 (CALC)
CO2 SERPL-SCNC: 29 MMOL/L (ref 20–32)
COLOR UR: YELLOW
CREAT SERPL-MCNC: 1.15 MG/DL (ref 0.6–0.95)
CREAT UR-MCNC: 203 MG/DL (ref 20–275)
EGFR: 47 ML/MIN/1.73M2
EOSINOPHIL # BLD AUTO: 173 CELLS/UL (ref 15–500)
EOSINOPHIL NFR BLD AUTO: 1.6 %
ERYTHROCYTE [DISTWIDTH] IN BLOOD BY AUTOMATED COUNT: 13.1 % (ref 11–15)
GLOBULIN SER CALC-MCNC: 2.4 G/DL (CALC) (ref 1.9–3.7)
GLUCOSE SERPL-MCNC: 59 MG/DL (ref 65–139)
GLUCOSE UR QL STRIP: NEGATIVE
HCT VFR BLD AUTO: 35.7 % (ref 35–45)
HDLC SERPL-MCNC: 65 MG/DL
HGB BLD-MCNC: 12.2 G/DL (ref 11.7–15.5)
HGB UR QL STRIP: NEGATIVE
HYALINE CASTS #/AREA URNS LPF: ABNORMAL /LPF
KETONES UR QL STRIP: ABNORMAL
LDLC SERPL CALC-MCNC: 55 MG/DL (CALC)
LEUKOCYTE ESTERASE UR QL STRIP: ABNORMAL
LYMPHOCYTES # BLD AUTO: 2927 CELLS/UL (ref 850–3900)
LYMPHOCYTES NFR BLD AUTO: 27.1 %
MCH RBC QN AUTO: 30.9 PG (ref 27–33)
MCHC RBC AUTO-ENTMCNC: 34.2 G/DL (ref 32–36)
MCV RBC AUTO: 90.4 FL (ref 80–100)
MICROALBUMIN UR-MCNC: 2 MG/DL
MONOCYTES # BLD AUTO: 734 CELLS/UL (ref 200–950)
MONOCYTES NFR BLD AUTO: 6.8 %
NEUTROPHILS # BLD AUTO: 6901 CELLS/UL (ref 1500–7800)
NEUTROPHILS NFR BLD AUTO: 63.9 %
NITRITE UR QL STRIP: NEGATIVE
NONHDLC SERPL-MCNC: 81 MG/DL (CALC)
PH UR STRIP: 5.5 [PH] (ref 5–8)
PLATELET # BLD AUTO: 210 THOUSAND/UL (ref 140–400)
PMV BLD REES-ECKER: 10.5 FL (ref 7.5–12.5)
POTASSIUM SERPL-SCNC: 3.7 MMOL/L (ref 3.5–5.3)
PROT SERPL-MCNC: 6.9 G/DL (ref 6.1–8.1)
PROT UR QL STRIP: NEGATIVE
RBC # BLD AUTO: 3.95 MILLION/UL (ref 3.8–5.1)
RBC #/AREA URNS HPF: ABNORMAL /HPF
SERVICE CMNT-IMP: ABNORMAL
SODIUM SERPL-SCNC: 144 MMOL/L (ref 135–146)
SP GR UR STRIP: 1.02 (ref 1–1.03)
SQUAMOUS #/AREA URNS HPF: ABNORMAL /HPF
T4 FREE SERPL-MCNC: 1 NG/DL (ref 0.8–1.8)
TRIGL SERPL-MCNC: 188 MG/DL
TSH SERPL-ACNC: 7.82 MIU/L (ref 0.4–4.5)
WBC # BLD AUTO: 10.8 THOUSAND/UL (ref 3.8–10.8)
WBC #/AREA URNS HPF: ABNORMAL /HPF

## 2023-10-16 DIAGNOSIS — E03.9 HYPOTHYROIDISM, UNSPECIFIED TYPE: ICD-10-CM

## 2023-10-16 DIAGNOSIS — F41.9 ANXIETY: ICD-10-CM

## 2023-10-16 RX ORDER — LEVOTHYROXINE SODIUM 50 UG/1
50 TABLET ORAL
Qty: 90 TABLET | Refills: 1 | Status: SHIPPED | OUTPATIENT
Start: 2023-10-16 | End: 2024-03-12 | Stop reason: SDUPTHER

## 2023-10-16 RX ORDER — CLONAZEPAM 0.5 MG/1
0.5 TABLET ORAL 2 TIMES DAILY PRN
Qty: 45 TABLET | Refills: 0 | Status: SHIPPED | OUTPATIENT
Start: 2023-10-16 | End: 2024-10-15

## 2023-10-16 NOTE — TELEPHONE ENCOUNTER
----- Message from Coty Rodriguez sent at 10/16/2023 11:52 AM CDT -----  - 11:39-pt needs refill levothyroxine and clonazepam   [facility]      [phone]

## 2023-11-06 VITALS
HEART RATE: 58 BPM | WEIGHT: 130.81 LBS | OXYGEN SATURATION: 99 % | BODY MASS INDEX: 25.68 KG/M2 | HEIGHT: 60 IN | SYSTOLIC BLOOD PRESSURE: 139 MMHG | DIASTOLIC BLOOD PRESSURE: 68 MMHG

## 2023-11-07 LAB — HBA1C MFR BLD: 5.7 %

## 2023-12-18 DIAGNOSIS — E78.5 HYPERLIPIDEMIA, UNSPECIFIED HYPERLIPIDEMIA TYPE: ICD-10-CM

## 2023-12-18 DIAGNOSIS — K21.9 GASTROESOPHAGEAL REFLUX DISEASE, UNSPECIFIED WHETHER ESOPHAGITIS PRESENT: ICD-10-CM

## 2023-12-18 RX ORDER — PANTOPRAZOLE SODIUM 40 MG/1
40 TABLET, DELAYED RELEASE ORAL DAILY
Qty: 90 TABLET | Refills: 3 | Status: SHIPPED | OUTPATIENT
Start: 2023-12-18

## 2023-12-18 RX ORDER — ROSUVASTATIN CALCIUM 20 MG/1
20 TABLET, COATED ORAL DAILY
Qty: 90 TABLET | Refills: 3 | Status: SHIPPED | OUTPATIENT
Start: 2023-12-18 | End: 2024-12-17

## 2023-12-18 NOTE — TELEPHONE ENCOUNTER
----- Message from Coty Rodriguez sent at 12/18/2023  1:58 PM CST -----  Vm- pt needs refill on rosuvastatin 20 mg, pantoprazole 40 mg   [facility]   [phone]

## 2024-01-09 ENCOUNTER — OFFICE VISIT (OUTPATIENT)
Dept: FAMILY MEDICINE | Facility: CLINIC | Age: 84
End: 2024-01-09
Payer: MEDICARE

## 2024-01-09 VITALS
WEIGHT: 129 LBS | OXYGEN SATURATION: 98 % | HEART RATE: 78 BPM | DIASTOLIC BLOOD PRESSURE: 66 MMHG | BODY MASS INDEX: 25.32 KG/M2 | HEIGHT: 60 IN | SYSTOLIC BLOOD PRESSURE: 106 MMHG

## 2024-01-09 DIAGNOSIS — I10 PRIMARY HYPERTENSION: ICD-10-CM

## 2024-01-09 DIAGNOSIS — E78.00 PURE HYPERCHOLESTEROLEMIA: ICD-10-CM

## 2024-01-09 DIAGNOSIS — Z79.899 HIGH RISK MEDICATION USE: ICD-10-CM

## 2024-01-09 DIAGNOSIS — Z85.43 HISTORY OF OVARIAN CANCER: ICD-10-CM

## 2024-01-09 DIAGNOSIS — Z86.73 HISTORY OF TIA (TRANSIENT ISCHEMIC ATTACK): ICD-10-CM

## 2024-01-09 DIAGNOSIS — N18.31 STAGE 3A CHRONIC KIDNEY DISEASE: ICD-10-CM

## 2024-01-09 DIAGNOSIS — R79.9 ABNORMAL FINDING OF BLOOD CHEMISTRY, UNSPECIFIED: ICD-10-CM

## 2024-01-09 DIAGNOSIS — K21.00 GASTROESOPHAGEAL REFLUX DISEASE WITH ESOPHAGITIS WITHOUT HEMORRHAGE: Primary | ICD-10-CM

## 2024-01-09 DIAGNOSIS — F41.9 ANXIETY: ICD-10-CM

## 2024-01-09 DIAGNOSIS — E03.8 OTHER SPECIFIED HYPOTHYROIDISM: ICD-10-CM

## 2024-01-09 PROCEDURE — 99214 OFFICE O/P EST MOD 30 MIN: CPT | Mod: S$GLB,,, | Performed by: NURSE PRACTITIONER

## 2024-01-09 NOTE — PROGRESS NOTES
SUBJECTIVE:    Patient ID: Hailey Mariano is a 83 y.o. female.    Chief Complaint: Follow-up (No bottles/ pt needs refills/no complaints)    83-year-old female presents for check up. . Treated for htn hyperlipidemia hypothyroid, history of tia 2017, diverticulitis with colon resection, gerd and anxiety. Due for labs. Taking medication as prescribed. bp in office today is well controlled. Plans to have labs done today. Trying to watch diet some. Sleeps well. Stays active at house. No complaints today        Office Visit on 01/09/2024   Component Date Value Ref Range Status    TSH w/reflex to FT4 01/09/2024 1.13  0.40 - 4.50 mIU/L Final    Glucose 01/09/2024 100 (H)  65 - 99 mg/dL Final    BUN 01/09/2024 19  7 - 25 mg/dL Final    Creatinine 01/09/2024 1.14 (H)  0.60 - 0.95 mg/dL Final    eGFR 01/09/2024 48 (L)  > OR = 60 mL/min/1.73m2 Final    BUN/Creatinine Ratio 01/09/2024 17  6 - 22 (calc) Final    Sodium 01/09/2024 144  135 - 146 mmol/L Final    Potassium 01/09/2024 4.0  3.5 - 5.3 mmol/L Final    Chloride 01/09/2024 105  98 - 110 mmol/L Final    CO2 01/09/2024 27  20 - 32 mmol/L Final    Calcium 01/09/2024 9.6  8.6 - 10.4 mg/dL Final    Total Protein 01/09/2024 6.8  6.1 - 8.1 g/dL Final    Albumin 01/09/2024 4.5  3.6 - 5.1 g/dL Final    Globulin, Total 01/09/2024 2.3  1.9 - 3.7 g/dL (calc) Final    Albumin/Globulin Ratio 01/09/2024 2.0  1.0 - 2.5 (calc) Final    Total Bilirubin 01/09/2024 0.7  0.2 - 1.2 mg/dL Final    Alkaline Phosphatase 01/09/2024 39  37 - 153 U/L Final    AST 01/09/2024 18  10 - 35 U/L Final    ALT 01/09/2024 10  6 - 29 U/L Final    Hemoglobin A1C 01/09/2024 5.9 (H)  <5.7 % of total Hgb Final   Office Visit on 10/04/2023   Component Date Value Ref Range Status    Hemoglobin A1C, POC 11/07/2023 5.7  % Final   Telephone on 10/04/2023   Component Date Value Ref Range Status    WBC 10/04/2023 10.8  3.8 - 10.8 Thousand/uL Final    RBC 10/04/2023 3.95  3.80 - 5.10 Million/uL Final    Hemoglobin  10/04/2023 12.2  11.7 - 15.5 g/dL Final    Hematocrit 10/04/2023 35.7  35.0 - 45.0 % Final    MCV 10/04/2023 90.4  80.0 - 100.0 fL Final    MCH 10/04/2023 30.9  27.0 - 33.0 pg Final    MCHC 10/04/2023 34.2  32.0 - 36.0 g/dL Final    RDW 10/04/2023 13.1  11.0 - 15.0 % Final    Platelets 10/04/2023 210  140 - 400 Thousand/uL Final    MPV 10/04/2023 10.5  7.5 - 12.5 fL Final    Neutrophils, Abs 10/04/2023 6,901  1,500 - 7,800 cells/uL Final    Lymph # 10/04/2023 2,927  850 - 3,900 cells/uL Final    Mono # 10/04/2023 734  200 - 950 cells/uL Final    Eos # 10/04/2023 173  15 - 500 cells/uL Final    Baso # 10/04/2023 65  0 - 200 cells/uL Final    Neutrophils Relative 10/04/2023 63.9  % Final    Lymph % 10/04/2023 27.1  % Final    Mono % 10/04/2023 6.8  % Final    Eosinophil % 10/04/2023 1.6  % Final    Basophil % 10/04/2023 0.6  % Final    Glucose 10/04/2023 59 (L)  65 - 139 mg/dL Final    BUN 10/04/2023 21  7 - 25 mg/dL Final    Creatinine 10/04/2023 1.15 (H)  0.60 - 0.95 mg/dL Final    eGFR 10/04/2023 47 (L)  > OR = 60 mL/min/1.73m2 Final    BUN/Creatinine Ratio 10/04/2023 18  6 - 22 (calc) Final    Sodium 10/04/2023 144  135 - 146 mmol/L Final    Potassium 10/04/2023 3.7  3.5 - 5.3 mmol/L Final    Chloride 10/04/2023 105  98 - 110 mmol/L Final    CO2 10/04/2023 29  20 - 32 mmol/L Final    Calcium 10/04/2023 9.5  8.6 - 10.4 mg/dL Final    Total Protein 10/04/2023 6.9  6.1 - 8.1 g/dL Final    Albumin 10/04/2023 4.5  3.6 - 5.1 g/dL Final    Globulin, Total 10/04/2023 2.4  1.9 - 3.7 g/dL (calc) Final    Albumin/Globulin Ratio 10/04/2023 1.9  1.0 - 2.5 (calc) Final    Total Bilirubin 10/04/2023 0.5  0.2 - 1.2 mg/dL Final    Alkaline Phosphatase 10/04/2023 44  37 - 153 U/L Final    AST 10/04/2023 14  10 - 35 U/L Final    ALT 10/04/2023 9  6 - 29 U/L Final    Cholesterol 10/04/2023 146  <200 mg/dL Final    HDL 10/04/2023 65  > OR = 50 mg/dL Final    Triglycerides 10/04/2023 188 (H)  <150 mg/dL Final    LDL Cholesterol  10/04/2023 55  mg/dL (calc) Final    HDL/Cholesterol Ratio 10/04/2023 2.2  <5.0 (calc) Final    Non HDL Chol. (LDL+VLDL) 10/04/2023 81  <130 mg/dL (calc) Final    Creatinine, Urine 10/04/2023 203  20 - 275 mg/dL Final    Microalb, Ur 10/04/2023 2.0  See Note: mg/dL Final    Microalb/Creat Ratio 10/04/2023 10  <30 mcg/mg creat Final    TSH w/reflex to FT4 10/04/2023 7.82 (H)  0.40 - 4.50 mIU/L Final    T4, Free 10/04/2023 1.0  0.8 - 1.8 ng/dL Final    Color, UA 10/04/2023 YELLOW  YELLOW Final    Appearance, UA 10/04/2023 CLEAR  CLEAR Final    Specific Gravity, UA 10/04/2023 1.023  1.001 - 1.035 Final    pH, UA 10/04/2023 5.5  5.0 - 8.0 Final    Glucose, UA 10/04/2023 NEGATIVE  NEGATIVE Final    Bilirubin, UA 10/04/2023 NEGATIVE  NEGATIVE Final    Ketones, UA 10/04/2023 TRACE (A)  NEGATIVE Final    Occult Blood UA 10/04/2023 NEGATIVE  NEGATIVE Final    Protein, UA 10/04/2023 NEGATIVE  NEGATIVE Final    Nitrite, UA 10/04/2023 NEGATIVE  NEGATIVE Final    Leukocytes, UA 10/04/2023 1+ (A)  NEGATIVE Final    WBC Casts, UA 10/04/2023 0-5  < OR = 5 /HPF Final    RBC Casts, UA 10/04/2023 NONE SEEN  < OR = 2 /HPF Final    Squam Epithel, UA 10/04/2023 0-5  < OR = 5 /HPF Final    Bacteria, UA 10/04/2023 NONE SEEN  NONE SEEN /HPF Final    Hyaline Casts, UA 10/04/2023 0-5 (A)  NONE SEEN /LPF Final    Service Cmt: 10/04/2023    Final    Reflexive Urine Culture 10/04/2023    Final    Urine Culture, Routine 10/04/2023    Final       Past Medical History:   Diagnosis Date    Anxiety     Depression     Diverticulitis     GERD (gastroesophageal reflux disease)     Hyperlipidemia     Hypertension     Ovarian cancer 2002    TIA (transient ischemic attack)      Social History     Socioeconomic History    Marital status:    Tobacco Use    Smoking status: Never    Smokeless tobacco: Never   Substance and Sexual Activity    Alcohol use: Not Currently     Alcohol/week: 1.0 standard drink of alcohol     Types: 1 Glasses of wine per week      Past Surgical History:   Procedure Laterality Date    HERNIA REPAIR      HYSTERECTOMY       History reviewed. No pertinent family history.    All of your core healthy metrics are met.      Review of patient's allergies indicates:   Allergen Reactions    Flagyl [metronidazole hcl]     Morphine     Pcn [penicillins]     Sulfa (sulfonamide antibiotics)        Current Outpatient Medications:     clonazePAM (KLONOPIN) 0.5 MG tablet, Take 1 tablet (0.5 mg total) by mouth 2 (two) times daily as needed for Anxiety., Disp: 45 tablet, Rfl: 0    clopidogreL (PLAVIX) 75 mg tablet, Take 1 tablet (75 mg total) by mouth once daily., Disp: 90 tablet, Rfl: 1    clotrimazole-betamethasone 1-0.05% (LOTRISONE) cream, Apply topically 2 (two) times daily., Disp: 45 g, Rfl: 1    levothyroxine (SYNTHROID) 50 MCG tablet, Take 1 tablet (50 mcg total) by mouth before breakfast., Disp: 90 tablet, Rfl: 1    pantoprazole (PROTONIX) 40 MG tablet, Take 1 tablet (40 mg total) by mouth once daily., Disp: 90 tablet, Rfl: 3    rosuvastatin (CRESTOR) 20 MG tablet, Take 1 tablet (20 mg total) by mouth once daily., Disp: 90 tablet, Rfl: 3    sertraline (ZOLOFT) 50 MG tablet, Take 1 tablet (50 mg total) by mouth once daily., Disp: 90 tablet, Rfl: 1    triamcinolone acetonide 0.1% (KENALOG) 0.1 % cream, Apply topically 2 (two) times daily., Disp: 45 g, Rfl: 5    clonazePAM (KLONOPIN) 0.5 MG tablet, Take 0.5 mg by mouth once as needed for Anxiety., Disp: , Rfl:     Review of Systems   Constitutional:  Negative for chills, fever and unexpected weight change.   HENT:  Negative for ear pain, rhinorrhea and sore throat.    Eyes:  Negative for pain and visual disturbance.   Respiratory:  Negative for cough and shortness of breath.    Cardiovascular:  Negative for chest pain, palpitations and leg swelling.   Gastrointestinal:  Negative for abdominal pain, diarrhea, nausea and vomiting.   Genitourinary:  Negative for difficulty urinating, hematuria and vaginal  bleeding.   Musculoskeletal:  Negative for arthralgias.   Skin:  Negative for rash.   Neurological:  Negative for dizziness, weakness and headaches.   Psychiatric/Behavioral:  Negative for agitation and sleep disturbance. The patient is not nervous/anxious.           Objective:      Vitals:    01/09/24 1338   BP: 106/66   Pulse: 78   SpO2: 98%   Weight: 58.5 kg (129 lb)   Height: 5' (1.524 m)     Physical Exam  Vitals and nursing note reviewed.   Constitutional:       General: She is not in acute distress.     Appearance: Normal appearance. She is well-developed.   HENT:      Head: Normocephalic and atraumatic.      Right Ear: External ear normal.      Left Ear: External ear normal.   Eyes:      Conjunctiva/sclera: Conjunctivae normal.   Neck:      Vascular: No JVD.   Cardiovascular:      Rate and Rhythm: Normal rate and regular rhythm.      Heart sounds: No murmur heard.  Pulmonary:      Effort: Pulmonary effort is normal.      Breath sounds: Normal breath sounds.   Abdominal:      General: Bowel sounds are normal.      Palpations: Abdomen is soft.   Musculoskeletal:         General: No deformity. Normal range of motion.      Cervical back: Normal range of motion and neck supple.   Lymphadenopathy:      Cervical: No cervical adenopathy.   Skin:     General: Skin is warm and dry.      Findings: No rash.   Neurological:      Mental Status: She is alert and oriented to person, place, and time.      Gait: Gait normal.   Psychiatric:         Speech: Speech normal.         Behavior: Behavior normal.           Assessment:       1. Gastroesophageal reflux disease with esophagitis without hemorrhage    2. Stage 3a chronic kidney disease    3. Other specified hypothyroidism    4. Primary hypertension    5. Pure hypercholesterolemia    6. Anxiety    7. Abnormal finding of blood chemistry, unspecified    8. History of TIA (transient ischemic attack)    9. History of ovarian cancer    10. High risk medication use         Plan:        Gastroesophageal reflux disease with esophagitis without hemorrhage  Comments:  protonix    Stage 3a chronic kidney disease    Other specified hypothyroidism  Comments:  levothyroxine  Orders:  -     TSH w/reflex to FT4; Future; Expected date: 01/09/2024  -     Comprehensive Metabolic Panel; Future; Expected date: 01/09/2024  -     Hemoglobin A1C; Future; Expected date: 01/09/2024    Primary hypertension  Comments:  bp controlled  Orders:  -     TSH w/reflex to FT4; Future; Expected date: 01/09/2024  -     Comprehensive Metabolic Panel; Future; Expected date: 01/09/2024  -     Hemoglobin A1C; Future; Expected date: 01/09/2024    Pure hypercholesterolemia  Comments:  crestor  Orders:  -     TSH w/reflex to FT4; Future; Expected date: 01/09/2024  -     Comprehensive Metabolic Panel; Future; Expected date: 01/09/2024  -     Hemoglobin A1C; Future; Expected date: 01/09/2024    Anxiety  Comments:  klonopin    Abnormal finding of blood chemistry, unspecified  -     Hemoglobin A1C; Future; Expected date: 01/09/2024    History of TIA (transient ischemic attack)    History of ovarian cancer    High risk medication use      Follow up in about 6 months (around 7/9/2024), or if symptoms worsen or fail to improve, for medication management.        1/12/2024 Linnette Ghotra

## 2024-01-10 LAB
ALBUMIN SERPL-MCNC: 4.5 G/DL (ref 3.6–5.1)
ALBUMIN/GLOB SERPL: 2 (CALC) (ref 1–2.5)
ALP SERPL-CCNC: 39 U/L (ref 37–153)
ALT SERPL-CCNC: 10 U/L (ref 6–29)
AST SERPL-CCNC: 18 U/L (ref 10–35)
BILIRUB SERPL-MCNC: 0.7 MG/DL (ref 0.2–1.2)
BUN SERPL-MCNC: 19 MG/DL (ref 7–25)
BUN/CREAT SERPL: 17 (CALC) (ref 6–22)
CALCIUM SERPL-MCNC: 9.6 MG/DL (ref 8.6–10.4)
CHLORIDE SERPL-SCNC: 105 MMOL/L (ref 98–110)
CO2 SERPL-SCNC: 27 MMOL/L (ref 20–32)
CREAT SERPL-MCNC: 1.14 MG/DL (ref 0.6–0.95)
EGFR: 48 ML/MIN/1.73M2
GLOBULIN SER CALC-MCNC: 2.3 G/DL (CALC) (ref 1.9–3.7)
GLUCOSE SERPL-MCNC: 100 MG/DL (ref 65–99)
HBA1C MFR BLD: 5.9 % OF TOTAL HGB
POTASSIUM SERPL-SCNC: 4 MMOL/L (ref 3.5–5.3)
PROT SERPL-MCNC: 6.8 G/DL (ref 6.1–8.1)
SODIUM SERPL-SCNC: 144 MMOL/L (ref 135–146)
TSH SERPL-ACNC: 1.13 MIU/L (ref 0.4–4.5)

## 2024-01-12 ENCOUNTER — TELEPHONE (OUTPATIENT)
Dept: FAMILY MEDICINE | Facility: CLINIC | Age: 84
End: 2024-01-12
Payer: MEDICARE

## 2024-01-12 NOTE — TELEPHONE ENCOUNTER
----- Message from Linnette Ghotra NP sent at 1/12/2024 12:40 AM CST -----  Kidney function is stable. Hga1c slight increase. Rest of labs normal. Resume diet . Decrease sweets in diet.

## 2024-01-16 ENCOUNTER — TELEPHONE (OUTPATIENT)
Dept: FAMILY MEDICINE | Facility: CLINIC | Age: 84
End: 2024-01-16
Payer: MEDICARE

## 2024-01-18 ENCOUNTER — TELEPHONE (OUTPATIENT)
Dept: FAMILY MEDICINE | Facility: CLINIC | Age: 84
End: 2024-01-18
Payer: MEDICARE

## 2024-01-19 ENCOUNTER — TELEPHONE (OUTPATIENT)
Dept: FAMILY MEDICINE | Facility: CLINIC | Age: 84
End: 2024-01-19
Payer: MEDICARE

## 2024-02-22 DIAGNOSIS — F41.9 ANXIETY: ICD-10-CM

## 2024-02-22 DIAGNOSIS — Z79.899 HIGH RISK MEDICATION USE: ICD-10-CM

## 2024-02-22 DIAGNOSIS — F32.A DEPRESSION, UNSPECIFIED DEPRESSION TYPE: ICD-10-CM

## 2024-02-22 RX ORDER — SERTRALINE HYDROCHLORIDE 50 MG/1
50 TABLET, FILM COATED ORAL DAILY
Qty: 90 TABLET | Refills: 1 | Status: SHIPPED | OUTPATIENT
Start: 2024-02-22

## 2024-02-22 RX ORDER — CLOPIDOGREL BISULFATE 75 MG/1
75 TABLET ORAL DAILY
Qty: 90 TABLET | Refills: 1 | Status: SHIPPED | OUTPATIENT
Start: 2024-02-22 | End: 2024-03-12 | Stop reason: SDUPTHER

## 2024-02-22 NOTE — TELEPHONE ENCOUNTER
----- Message from Coty Rodriguez sent at 2/22/2024 12:58 PM CST -----  Vm- 12:12- pt needs refill on zoloft, plavix   [facility]   [phone]

## 2024-02-26 ENCOUNTER — OFFICE VISIT (OUTPATIENT)
Dept: ORTHOPEDICS | Facility: CLINIC | Age: 84
End: 2024-02-26
Payer: MEDICARE

## 2024-02-26 VITALS — HEIGHT: 60 IN | BODY MASS INDEX: 25.32 KG/M2 | WEIGHT: 129 LBS

## 2024-02-26 DIAGNOSIS — M47.816 FACET ARTHRITIS OF LUMBAR REGION: Primary | ICD-10-CM

## 2024-02-26 DIAGNOSIS — M51.36 DEGENERATIVE LUMBAR DISC: ICD-10-CM

## 2024-02-26 PROCEDURE — 99213 OFFICE O/P EST LOW 20 MIN: CPT | Mod: S$GLB,,, | Performed by: ORTHOPAEDIC SURGERY

## 2024-02-26 NOTE — PROGRESS NOTES
Subjective:       Patient ID: Hailey Mariano is a 83 y.o. female.    Chief Complaint: Pain of the Spine (New patient with c/o of mid lower back pain for two weeks. States intermittent aching pain at belt line. Pain worse in the morning and with prolonged standing. Notes fall in June of 2023)      History of Present Illness    Prior to meeting with the patient I reviewed the medical chart in [facility]. This included reviewing the previous progress notes from our office, review of the patient's last appointment with their primary care provider, review of any visits to the emergency room, and review of any pain management appointments or procedures.   Two week history of low back pain without radiation does not have any history of trauma she did fall 8 months ago but had no back pain at that time no radiation to either lower extremity can do all of her normal activities but still notices a little mild discomfort    Current Medications  Current Outpatient Medications   Medication Sig Dispense Refill    clonazePAM (KLONOPIN) 0.5 MG tablet Take 0.5 mg by mouth once as needed for Anxiety.      clonazePAM (KLONOPIN) 0.5 MG tablet Take 1 tablet (0.5 mg total) by mouth 2 (two) times daily as needed for Anxiety. 45 tablet 0    clopidogreL (PLAVIX) 75 mg tablet Take 1 tablet (75 mg total) by mouth once daily. 90 tablet 1    clotrimazole-betamethasone 1-0.05% (LOTRISONE) cream Apply topically 2 (two) times daily. 45 g 1    levothyroxine (SYNTHROID) 50 MCG tablet Take 1 tablet (50 mcg total) by mouth before breakfast. 90 tablet 1    pantoprazole (PROTONIX) 40 MG tablet Take 1 tablet (40 mg total) by mouth once daily. 90 tablet 3    rosuvastatin (CRESTOR) 20 MG tablet Take 1 tablet (20 mg total) by mouth once daily. 90 tablet 3    sertraline (ZOLOFT) 50 MG tablet Take 1 tablet (50 mg total) by mouth once daily. 90 tablet 1    triamcinolone acetonide 0.1% (KENALOG) 0.1 % cream Apply topically 2 (two) times daily. 45 g 5     No  current facility-administered medications for this visit.       Allergies  Review of patient's allergies indicates:   Allergen Reactions    Flagyl [metronidazole hcl]     Morphine     Pcn [penicillins]     Sulfa (sulfonamide antibiotics)        Past Medical History  Past Medical History:   Diagnosis Date    Anxiety     Depression     Diverticulitis     GERD (gastroesophageal reflux disease)     Hyperlipidemia     Hypertension     Ovarian cancer 2002    TIA (transient ischemic attack)        Surgical History  Past Surgical History:   Procedure Laterality Date    HERNIA REPAIR      HYSTERECTOMY         Family History:   History reviewed. No pertinent family history.    Social History:   Social History     Socioeconomic History    Marital status:    Tobacco Use    Smoking status: Never    Smokeless tobacco: Never   Substance and Sexual Activity    Alcohol use: Not Currently     Alcohol/week: 1.0 standard drink of alcohol     Types: 1 Glasses of wine per week       Hospitalization/Major Diagnostic Procedure:     Review of Systems     General/Constitutional:  Chills denies. Fatigue denies. Fever denies. Weight gain denies. Weight loss denies.    Respiratory:  Shortness of breath denies.    Cardiovascular:  Chest pain denies.    Gastrointestinal:  Constipation denies. Diarrhea denies. Nausea denies. Vomiting denies.     Hematology:  Easy bruising denies. Prolonged bleeding denies.     Genitourinary:  Frequent urination denies. Pain in lower back denies. Painful urination denies.     Musculoskeletal:  See HPI for details    Skin:  Rash denies.    Neurologic:  Dizziness denies. Gait abnormalities denies. Seizures denies. Tingling/Numbess denies.    Psychiatric:  Anxiety denies. Depressed mood denies.     Objective:   Vital Signs: There were no vitals filed for this visit.     Physical Exam      General Examination:     Constitutional: The patient is alert and oriented to lace person and time. Mood is pleasant.      Head/Face: Normal facial features normal eyebrows    Eyes: Normal extraocular motion bilaterally    Lungs: Respirations are equal and unlabored    Gait is coordinated.    Cardiovascular: There are no swelling or varicosities present.    Lymphatic: Negative for adenopathy    Skin: Normal    Neurological: Level of consciousness normal. Oriented to place person and time and situation    Psychiatric: Oriented to time place person and situation    Tender midline L4-5 interlaminar area and paraspinous muscles without spasm range of motion normal endpoint pain with extension hip and knee range of motion intact straight leg raising maneuver is negative motor exam normal  XRAY Report/ Interpretation:  AP and lateral x-rays of lumbar spine will performed today. Indications low back pain. Findings:  Multilevel lumbar facet joint arthritis worse at L5-S1 small degenerative spondylolisthesis at L5-S1      Assessment:       1. Facet arthritis of lumbar region    2. Degenerative lumbar disc        Plan:       Hailey was seen today for pain.    Diagnoses and all orders for this visit:    Facet arthritis of lumbar region  -     X-Ray Lumbar Spine AP And Lateral; Future    Degenerative lumbar disc  -     X-Ray Lumbar Spine AP And Lateral; Future         Follow up if symptoms worsen or fail to improve.    The explanation of this structural problem was discussed I think she has a low symptomatic facet arthritis I think the symptoms will resolve with time she prefers home exercise program which was provided return p.r.n. if symptoms do not resolve within 3 weeks patient agrees with treatment plan.  Treatment options were discussed with regards to the nature of the medical condition. Conservative pain intervention and surgical options were discussed in detail. The probability of success of each separate treatment option was discussed. The patient expressed a clear understanding of the treatment options. With regards to surgery, the  procedure risk, benefits, complications, and outcomes were discussed. No guarantees were given with regards to surgical outcome.   The risk of complications, morbidity, and mortality of patient management decisions have been made at the time of this visit. These are associated with the patient's problems, diagnostic procedures and treatment options. This includes the possible management options selected and those considered but not selected by the patient after shared medical decision making we discussed with the patient.     This note was created using Dragon voice recognition software that occasionally misinterpreted phrases or words.

## 2024-03-12 DIAGNOSIS — E03.9 HYPOTHYROIDISM, UNSPECIFIED TYPE: ICD-10-CM

## 2024-03-12 DIAGNOSIS — Z79.899 HIGH RISK MEDICATION USE: ICD-10-CM

## 2024-03-12 RX ORDER — CLOPIDOGREL BISULFATE 75 MG/1
75 TABLET ORAL DAILY
Qty: 90 TABLET | Refills: 1 | Status: SHIPPED | OUTPATIENT
Start: 2024-03-12

## 2024-03-12 RX ORDER — LEVOTHYROXINE SODIUM 50 UG/1
50 TABLET ORAL
Qty: 90 TABLET | Refills: 1 | Status: SHIPPED | OUTPATIENT
Start: 2024-03-12 | End: 2024-04-15 | Stop reason: SDUPTHER

## 2024-03-12 NOTE — TELEPHONE ENCOUNTER
----- Message from Coty Rodriguez sent at 3/12/2024 11:27 AM CDT -----  Vm- 10:45-pt needs refill levothyroxine, clopidogrel     [phone]

## 2024-04-15 DIAGNOSIS — E03.9 HYPOTHYROIDISM, UNSPECIFIED TYPE: ICD-10-CM

## 2024-04-15 RX ORDER — LEVOTHYROXINE SODIUM 50 UG/1
50 TABLET ORAL
Qty: 90 TABLET | Refills: 1 | Status: SHIPPED | OUTPATIENT
Start: 2024-04-15 | End: 2025-04-15

## 2024-04-15 NOTE — TELEPHONE ENCOUNTER
----- Message from Coty Rodriguez sent at 4/15/2024 12:27 PM CDT -----  Vm- pt needs refill on synthroid   [facility]

## 2024-04-25 ENCOUNTER — OFFICE VISIT (OUTPATIENT)
Dept: ORTHOPEDICS | Facility: CLINIC | Age: 84
End: 2024-04-25
Payer: MEDICARE

## 2024-04-25 VITALS — WEIGHT: 129 LBS | BODY MASS INDEX: 25.32 KG/M2 | HEIGHT: 60 IN

## 2024-04-25 DIAGNOSIS — M47.816 FACET ARTHRITIS OF LUMBAR REGION: ICD-10-CM

## 2024-04-25 DIAGNOSIS — R52 PAIN: Primary | ICD-10-CM

## 2024-04-25 DIAGNOSIS — M51.36 DEGENERATIVE LUMBAR DISC: ICD-10-CM

## 2024-04-25 PROCEDURE — 96372 THER/PROPH/DIAG INJ SC/IM: CPT | Mod: S$GLB,,, | Performed by: PHYSICIAN ASSISTANT

## 2024-04-25 PROCEDURE — 99213 OFFICE O/P EST LOW 20 MIN: CPT | Mod: 25,S$GLB,, | Performed by: PHYSICIAN ASSISTANT

## 2024-04-25 RX ORDER — TRIAMCINOLONE ACETONIDE 40 MG/ML
40 INJECTION, SUSPENSION INTRA-ARTICULAR; INTRAMUSCULAR
Status: COMPLETED | OUTPATIENT
Start: 2024-04-25 | End: 2024-04-25

## 2024-04-25 RX ADMIN — TRIAMCINOLONE ACETONIDE 40 MG: 40 INJECTION, SUSPENSION INTRA-ARTICULAR; INTRAMUSCULAR at 01:04

## 2024-04-25 NOTE — PROGRESS NOTES
[facility]  [address]  [place] [address]  Phone: [phone]   [phone]    Patient's PCP: Linnette Ghotra NP  Referring Provider: No ref. provider found    Subjective:      Chief Complaint:   Chief Complaint   Patient presents with    Right Hip - Pain     Right hip pain history of lower back pain with degenerative disc of lumbar. Hip pain present for one week after bending to  books hear and felt pop has had intermittent pain since fine sitting or bending over pain is present laying flat or standing straight at 5-6/10       Past Medical History:   Diagnosis Date    Anxiety     Depression     Diverticulitis     GERD (gastroesophageal reflux disease)     Hyperlipidemia     Hypertension     Ovarian cancer 2002    TIA (transient ischemic attack)        Past Surgical History:   Procedure Laterality Date    HERNIA REPAIR      HYSTERECTOMY         Current Outpatient Medications   Medication Sig Dispense Refill    clonazePAM (KLONOPIN) 0.5 MG tablet Take 0.5 mg by mouth once as needed for Anxiety.      clonazePAM (KLONOPIN) 0.5 MG tablet Take 1 tablet (0.5 mg total) by mouth 2 (two) times daily as needed for Anxiety. 45 tablet 0    clopidogreL (PLAVIX) 75 mg tablet Take 1 tablet (75 mg total) by mouth once daily. 90 tablet 1    clotrimazole-betamethasone 1-0.05% (LOTRISONE) cream Apply topically 2 (two) times daily. 45 g 1    levothyroxine (SYNTHROID) 50 MCG tablet Take 1 tablet (50 mcg total) by mouth before breakfast. 90 tablet 1    pantoprazole (PROTONIX) 40 MG tablet Take 1 tablet (40 mg total) by mouth once daily. 90 tablet 3    rosuvastatin (CRESTOR) 20 MG tablet Take 1 tablet (20 mg total) by mouth once daily. 90 tablet 3    sertraline (ZOLOFT) 50 MG tablet Take 1 tablet (50 mg total) by mouth once daily. 90 tablet 1    triamcinolone acetonide 0.1% (KENALOG) 0.1 % cream Apply topically 2 (two) times daily. 45 g 5     No current facility-administered medications for this  visit.       Review of patient's allergies indicates:   Allergen Reactions    Flagyl [metronidazole hcl]     Morphine Other (See Comments)    Penicillins Other (See Comments)    Sulfa (sulfonamide antibiotics) Other (See Comments)       No family history on file.    Social History     Socioeconomic History    Marital status:    Tobacco Use    Smoking status: Never    Smokeless tobacco: Never   Substance and Sexual Activity    Alcohol use: Not Currently     Alcohol/week: 1.0 standard drink of alcohol     Types: 1 Glasses of wine per week       History of present illness:  Ms. Hart comes in today when acute flare-up of right-sided low back pain that began when she leaned over to  some books 1 week ago.  Denies any radicular symptoms.  Denies any bowel or bladder symptoms.    Review of Systems:    Constitutional: Negative for chills, fever and weight loss.   HENT: Negative for congestion.    Eyes: Negative for discharge and redness.   Respiratory: Negative for cough and shortness of breath.    Cardiovascular: Negative for chest pain.   Gastrointestinal: Negative for nausea and vomiting.   Musculoskeletal: See HPI.   Skin: Negative for rash.   Neurological: Negative for headaches.   Endo/Heme/Allergies: Does not bruise/bleed easily.   Psychiatric/Behavioral: The patient is not nervous/anxious.    All other systems reviewed and are negative.       Objective:      Physical Examination:    Vital Signs:  There were no vitals filed for this visit.    Body mass index is 25.19 kg/m².    This a well-developed, well nourished patient in no acute distress.  They are alert and oriented and cooperative to examination.     Lumbar exam:  Skin throughout the lower back clean dry and intact.  No erythema or ecchymosis.  No signs or symptoms of infection.  She is neurovascularly intact throughout bilateral lower extremities.  Negative straight leg raise bilaterally.  Bilateral calves soft and nontender.  Well-preserved  internal/external rotation of bilateral hips without pain.  Nontender over bilateral greater trochanters.  Some mild diffuse tenderness more so about the right-sided lumbar paravertebrals extending into her lumbosacral junction.  No tenderness into the gluteus.  She does stand erect.  She has more comfort with forward flexed posture.  Some increased pain with extension of the lumbar spine.  She can weightbear as tolerated on bilateral lower extremities.    Pertinent New Results:        XRAY Report / Interpretation:   Two views taken of the right hip today:  AP and lateral views.  They reveal no acute fractures or dislocations.  Femoroacetabular joint space well-maintained.      Assessment:       1. Pain    2. Facet arthritis of lumbar region    3. Degenerative lumbar disc      Plan:     Pain  -     X-Ray Hip 1 View Right (with Pelvis when performed)    Facet arthritis of lumbar region    Degenerative lumbar disc        Follow up if symptoms worsen or fail to improve.    Believe she has a flare-up of her lumbar degenerative disc disease and facet arthritis.  Administered an intramuscular injection of 40 mg of Kenalog.  She will continue using Tylenol.  Offered her formal physical therapy she politely declined.  She will call within the next 1-2 weeks if she does not get good relief with these treatment modalities and we can order PT at that time if she would like.  Otherwise, she can follow up with us on an as-needed basis.        Go Ledesma, MPAS, PA-C    This note was created using echoBase voice recognition software that occasionally misinterprets words or phrases.

## 2024-09-16 ENCOUNTER — TELEPHONE (OUTPATIENT)
Dept: FAMILY MEDICINE | Facility: CLINIC | Age: 84
End: 2024-09-16
Payer: MEDICARE

## 2024-09-16 NOTE — TELEPHONE ENCOUNTER
----- Message from Coty Rodriguez sent at 9/16/2024 10:48 AM CDT -----  Pt needs an appt with denis for a check up   [phone]

## 2024-09-25 DIAGNOSIS — Z78.0 MENOPAUSE: ICD-10-CM

## 2024-09-30 ENCOUNTER — PATIENT MESSAGE (OUTPATIENT)
Dept: ADMINISTRATIVE | Facility: HOSPITAL | Age: 84
End: 2024-09-30
Payer: MEDICARE

## 2024-10-07 DIAGNOSIS — F32.A DEPRESSION, UNSPECIFIED DEPRESSION TYPE: ICD-10-CM

## 2024-10-07 DIAGNOSIS — Z79.899 HIGH RISK MEDICATION USE: ICD-10-CM

## 2024-10-07 DIAGNOSIS — F41.9 ANXIETY: ICD-10-CM

## 2024-10-07 RX ORDER — SERTRALINE HYDROCHLORIDE 50 MG/1
50 TABLET, FILM COATED ORAL DAILY
Qty: 90 TABLET | Refills: 0 | Status: SHIPPED | OUTPATIENT
Start: 2024-10-07

## 2024-10-07 RX ORDER — CLOPIDOGREL BISULFATE 75 MG/1
75 TABLET ORAL DAILY
Qty: 90 TABLET | Refills: 0 | Status: SHIPPED | OUTPATIENT
Start: 2024-10-07

## 2024-10-07 NOTE — TELEPHONE ENCOUNTER
----- Message from Sydney sent at 10/7/2024 10:32 AM CDT -----  Pt needs to make an appt. Refill for Clopidogrel 75 mg, Zoloft. [facility].  Pt #[phone]

## 2024-10-09 ENCOUNTER — TELEPHONE (OUTPATIENT)
Dept: FAMILY MEDICINE | Facility: CLINIC | Age: 84
End: 2024-10-09
Payer: MEDICARE

## 2024-10-09 NOTE — TELEPHONE ENCOUNTER
----- Message from Sydney sent at 10/9/2024 11:18 AM CDT -----  Pt is returning the office call. Pt #[phone]

## 2024-10-11 ENCOUNTER — TELEPHONE (OUTPATIENT)
Dept: FAMILY MEDICINE | Facility: CLINIC | Age: 84
End: 2024-10-11
Payer: MEDICARE

## 2024-10-11 NOTE — TELEPHONE ENCOUNTER
----- Message from Daly sent at 10/11/2024 10:52 AM CDT -----  Pt needs  to make an annual appointment. Linnette's appointments are locked on my side.   [phone]

## 2024-10-14 ENCOUNTER — TELEPHONE (OUTPATIENT)
Dept: FAMILY MEDICINE | Facility: CLINIC | Age: 84
End: 2024-10-14
Payer: MEDICARE

## 2024-10-14 NOTE — TELEPHONE ENCOUNTER
----- Message from Sydney sent at 10/14/2024 11:07 AM CDT -----  Pt needs to make an appt. Pt #[phone]

## 2024-10-25 ENCOUNTER — TELEPHONE (OUTPATIENT)
Dept: FAMILY MEDICINE | Facility: CLINIC | Age: 84
End: 2024-10-25
Payer: MEDICARE

## 2024-10-25 NOTE — TELEPHONE ENCOUNTER
----- Message from Coty sent at 10/25/2024 11:44 AM CDT -----  Martina with [facility] is calling for refill clopidogrel 75 mg, sertraline hcl 50 mg 90 day with 3 refills

## 2024-11-11 ENCOUNTER — TELEPHONE (OUTPATIENT)
Dept: FAMILY MEDICINE | Facility: CLINIC | Age: 84
End: 2024-11-11

## 2024-11-11 ENCOUNTER — CLINICAL SUPPORT (OUTPATIENT)
Dept: FAMILY MEDICINE | Facility: CLINIC | Age: 84
End: 2024-11-11
Payer: MEDICARE

## 2024-11-11 DIAGNOSIS — I10 HYPERTENSION, UNSPECIFIED TYPE: ICD-10-CM

## 2024-11-11 DIAGNOSIS — I10 PRIMARY HYPERTENSION: ICD-10-CM

## 2024-11-11 DIAGNOSIS — F41.9 ANXIETY: Primary | ICD-10-CM

## 2024-11-11 DIAGNOSIS — N18.31 STAGE 3A CHRONIC KIDNEY DISEASE: ICD-10-CM

## 2024-11-11 DIAGNOSIS — E03.9 HYPOTHYROIDISM, UNSPECIFIED TYPE: Primary | ICD-10-CM

## 2024-11-11 DIAGNOSIS — E78.5 HYPERLIPIDEMIA, UNSPECIFIED HYPERLIPIDEMIA TYPE: ICD-10-CM

## 2024-11-11 DIAGNOSIS — E16.2 HYPOGLYCEMIA: ICD-10-CM

## 2024-11-11 NOTE — TELEPHONE ENCOUNTER
Pt here for Nurse visit. Asking fro refills. Pt missed multiple appts. Advised only 30 day supply and will need labs. No other refills until seen in person.   LMOR for pt with this info as well.

## 2024-11-11 NOTE — PROGRESS NOTES
Patient came to the office to discuss medication issues. Patient missed her last 2 appointments. One because she just missed it and the second she was sick. Patient states she was told that someone would call her to schedule an appointment and the patient stated she has not heard from anyone in weeks and she is completely out of her medications. She is having issues at home with her  not being able to hear her and it is really affecting her emotions. Informed patient I would discuss with Nori MENDEZ and see what is going on. Patient was scheduled for an appointment on 11/27/24. Spoke to Linnette about what was going on. Per Linnette ok to give a 30 day supply and patient needs lab work if she is due for them, also patient will not get anymore refills if she does not come to this appointment. Patient notified of above. Nori put in refills and lab work and left a voicemail for the patient regarding lab work.

## 2025-08-20 ENCOUNTER — PATIENT MESSAGE (OUTPATIENT)
Dept: ADMINISTRATIVE | Facility: HOSPITAL | Age: 85
End: 2025-08-20
Payer: MEDICARE